# Patient Record
Sex: FEMALE | Race: WHITE | NOT HISPANIC OR LATINO | Employment: FULL TIME | URBAN - METROPOLITAN AREA
[De-identification: names, ages, dates, MRNs, and addresses within clinical notes are randomized per-mention and may not be internally consistent; named-entity substitution may affect disease eponyms.]

---

## 2017-01-04 ENCOUNTER — RESEARCH ENCOUNTER (OUTPATIENT)
Dept: RESEARCH | Facility: HOSPITAL | Age: 42
End: 2017-01-04

## 2017-01-04 ENCOUNTER — INITIAL CONSULT (OUTPATIENT)
Dept: GYNECOLOGIC ONCOLOGY | Facility: CLINIC | Age: 42
End: 2017-01-04
Payer: COMMERCIAL

## 2017-01-04 ENCOUNTER — TELEPHONE (OUTPATIENT)
Dept: GYNECOLOGIC ONCOLOGY | Facility: CLINIC | Age: 42
End: 2017-01-04

## 2017-01-04 VITALS
HEART RATE: 75 BPM | SYSTOLIC BLOOD PRESSURE: 132 MMHG | HEIGHT: 62 IN | WEIGHT: 172.19 LBS | DIASTOLIC BLOOD PRESSURE: 79 MMHG | BODY MASS INDEX: 31.68 KG/M2

## 2017-01-04 DIAGNOSIS — E25.9 VIRILIZATION: Primary | ICD-10-CM

## 2017-01-04 DIAGNOSIS — N83.8 OVARIAN MASS, LEFT: ICD-10-CM

## 2017-01-04 PROCEDURE — 99999 PR PBB SHADOW E&M-EST. PATIENT-LVL III: CPT | Mod: PBBFAC,,, | Performed by: OBSTETRICS & GYNECOLOGY

## 2017-01-04 PROCEDURE — 1159F MED LIST DOCD IN RCRD: CPT | Mod: S$GLB,,, | Performed by: OBSTETRICS & GYNECOLOGY

## 2017-01-04 PROCEDURE — 99205 OFFICE O/P NEW HI 60 MIN: CPT | Mod: S$GLB,,, | Performed by: OBSTETRICS & GYNECOLOGY

## 2017-01-04 NOTE — PROGRESS NOTES
"Subjective:       Patient ID: Josseline Harper is a 41 y.o. female.    Chief Complaint: Virilising Lt Ovarian Tumor (Consult)    HPI     Saw Dr. Berny Campbell, Endocrinologist,  because of increasing facial hair. Thought to be due initially to PCO but testosterone levels did not suppress with Prempro or dexamethasone. Work up to date has included a CT scan that showed normal ovaries. Octreotide study was negative.  Pelvic US showed bilateral ovarian cysts consistent with PCO.    Then had PET scan done at [facility] in Sept 2016 that shows a hypermetabolic area in the left ovary with SUV of 5.1.     Referred for further management.   Patient wants to preserve fertility.     Pap: 2015: normal. 1 prior abnormal with follow up pap being negative.   Mammogram: Oct 2016: normal.     Review of Systems   Constitutional: Negative for chills, fatigue and fever.   Respiratory: Negative for cough, shortness of breath and wheezing.    Cardiovascular: Negative for chest pain, palpitations and leg swelling.   Gastrointestinal: Negative for abdominal pain, constipation, diarrhea, nausea and vomiting.   Genitourinary: Negative for difficulty urinating, dysuria, frequency, genital sores, hematuria, urgency, vaginal bleeding, vaginal discharge and vaginal pain.   Neurological: Negative for weakness.   Hematological: Negative for adenopathy. Does not bruise/bleed easily.   Psychiatric/Behavioral: The patient is not nervous/anxious.        Objective:       Visit Vitals    /79    Pulse 75    Ht 5' 2" (1.575 m)    Wt 78.1 kg (172 lb 2.9 oz)    BMI 31.49 kg/m2       Physical Exam   Constitutional: She is oriented to person, place, and time. She appears well-developed and well-nourished.   HENT:   Head: Normocephalic and atraumatic.   Eyes: No scleral icterus.   Neck: Neck supple. No tracheal deviation present. No thyroid mass and no thyromegaly present.   Cardiovascular: Normal rate and regular rhythm.  "   Pulmonary/Chest: Effort normal and breath sounds normal. She has no wheezes.   Abdominal: Soft. She exhibits no distension and no mass. There is no hepatosplenomegaly. There is no tenderness. There is no rebound and no guarding.   Genitourinary:   Genitourinary Comments: Bimanual exam:  Vulva: no lesions. Normal appearance  Urethra: Normal size and location. No lesions  Bladder: No masses or tenderness.  Vagina: normal mucosa. No lesion  Cervix: normal    Uterus: normal  Adnexa: no masses.  Rectovaginal: No posterior cul de sac thickening or nodularity.  Rectal: no masses. Nontender. Normal tone.      Musculoskeletal: She exhibits no edema or tenderness.   Lymphadenopathy:     She has no cervical adenopathy.     She has no axillary adenopathy.        Right: No inguinal and no supraclavicular adenopathy present.        Left: No inguinal and no supraclavicular adenopathy present.   Neurological: She is alert and oriented to person, place, and time.   Skin: Skin is warm and dry.   Psychiatric: She has a normal mood and affect. Her behavior is normal. Judgment and thought content normal.       Assessment:       1. Virilization    2. Ovarian mass, left        Plan:   Virilization  -     US Pelvis Comp with Transvag NON-OB (xpd; Future; Expected date: 1/4/17    Ovarian mass, left  Will plan to proceed with RALO. Will attempt to preserve left fallopian tube given her desire to maintain fertility.   She want to wait until March 2017 due to work.   Consent forms were reviewed with patient. Questions were answered. Patient voiced understanding. Consents were signed.  Preop orders placed.     -     US Pelvis Comp with Transvag NON-OB (xpd; Future; Expected date: 1/4/17  -     Basic metabolic panel; Future; Expected date: 1/4/17  -     CBC auto differential; Future; Expected date: 1/4/17  -     EKG 12-lead; Future        Distress Screening Results: Psychosocial Distress screening score of Distress Score: 3 noted and reviewed.  No intervention indicated.

## 2017-01-04 NOTE — PROGRESS NOTES
Pt and one family member were approached by Aga Stuart in clinic regarding participation in [employer] program (IRB#[id number]). Pt was agreeable.   The ICF was reviewed with pt. The discussion included:   - participation is voluntary;   - pt can change her mind about participating at any time;   - if she changes her mind about participation, she can call us at contact info in ICF and we will discard samples remaining;   - samples that have been used prior to her notification will still be included in research;   - specimens collected will include blood, urine and tissue;   - blood and urine will be collected pre-operatively if possible;   - tissue will be collected from Pathology after routine tests have been conducted;   - Dr. Pérez will perform procedure per his usual protocol - participation in [employer] program will not change amount of tissue removed;   - specimens will be stored with unique code that can only be linked to pt by [employer] staff;   - all medical information released to researchers will be stripped of identifiers;   - samples will not be released to outside researchers unless approved by internal committee;   - there is a small risk of loss of confidentiality, but we make every effort to ensure privacy;   - no other physical risks outside of those involved in standard of care procedure.     Pt did not have any questions. Pt willingly and independently signed ICF for [employer]. A copy of signed ICF will be mailed to the pt with instructions to call with any questions that may arise or if she should change her mind regarding participation in [employer] program.

## 2017-01-04 NOTE — Clinical Note
Thank you for the referral. She wants to wait until March for left oophorectomy. Surgery has been scheduled.

## 2017-01-18 ENCOUNTER — HOSPITAL ENCOUNTER (OUTPATIENT)
Dept: RADIOLOGY | Facility: HOSPITAL | Age: 42
Discharge: HOME | End: 2017-01-18
Attending: OBSTETRICS & GYNECOLOGY
Payer: COMMERCIAL

## 2017-01-18 DIAGNOSIS — E25.9 VIRILIZATION: ICD-10-CM

## 2017-01-18 DIAGNOSIS — N83.8 OVARIAN MASS, LEFT: ICD-10-CM

## 2017-01-18 PROCEDURE — 76830 TRANSVAGINAL US NON-OB: CPT | Mod: 26,,, | Performed by: RADIOLOGY

## 2017-01-18 PROCEDURE — 76856 US EXAM PELVIC COMPLETE: CPT | Mod: 26,,, | Performed by: RADIOLOGY

## 2017-01-18 PROCEDURE — 76856 US EXAM PELVIC COMPLETE: CPT | Mod: TC

## 2017-01-22 ENCOUNTER — PATIENT MESSAGE (OUTPATIENT)
Dept: GYNECOLOGIC ONCOLOGY | Facility: CLINIC | Age: 42
End: 2017-01-22

## 2017-02-15 ENCOUNTER — HOSPITAL ENCOUNTER (OUTPATIENT)
Dept: CARDIOLOGY | Facility: CLINIC | Age: 42
Discharge: HOME | End: 2017-02-15
Payer: COMMERCIAL

## 2017-02-15 DIAGNOSIS — N83.8 OVARIAN MASS, LEFT: ICD-10-CM

## 2017-02-15 PROCEDURE — 93000 ELECTROCARDIOGRAM COMPLETE: CPT | Mod: S$GLB,,, | Performed by: INTERNAL MEDICINE

## 2017-03-10 ENCOUNTER — TELEPHONE (OUTPATIENT)
Dept: GYNECOLOGIC ONCOLOGY | Facility: CLINIC | Age: 42
End: 2017-03-10

## 2017-03-10 ENCOUNTER — ANESTHESIA EVENT (OUTPATIENT)
Dept: SURGERY | Facility: HOSPITAL | Age: 42
End: 2017-03-10
Payer: COMMERCIAL

## 2017-03-10 NOTE — ANESTHESIA PREPROCEDURE EVALUATION
03/10/2017    Pre-operative evaluation for Procedure(s) (LRB):  XI ROBOT ASSISTED LAPAROSCOPIC OOPHERECTOMY (Left)    Josseline Harper is a 41 y.o. female with no significant PMH who is being evaluated for the above procedure secondary to left ovarian mass.     LDA: left wrist 18G     Prev airway:  None on file     Drips:  None       Patient Active Problem List   Diagnosis    Bilateral lower extremity edema    History of polycythemia    Neuropathy of right lower extremity    Vitamin D deficiency    Female hypertestosteronemia    Prehypertension    Hirsutism    Adrenal virilism syndrome    Virilization    Ovarian tumor    Ovarian mass, left       Review of patient's allergies indicates:  No Known Allergies     No current facility-administered medications on file prior to encounter.      Current Outpatient Prescriptions on File Prior to Encounter   Medication Sig Dispense Refill    artificial tears ointment (REFRESH P.M.) Oint nightly as needed.       ASCORBATE CALCIUM (VITAMIN C ORAL) Take by mouth.      DOCOSAHEXANOIC ACID/EPA (FISH OIL ORAL) Take by mouth.      ERGOCALCIFEROL, VITAMIN D2, (VITAMIN D ORAL) Take by mouth.      estrogen, conjugated,-medroxyprogesterone (PREMPRO) 0.625-5 mg per tablet Take 1 tablet by mouth once daily. 10 tablet 0    PV W-O BRANDAN/FERROUS FUMARATE/FA (M-VIT ORAL) Take by mouth.         Past Surgical History:   Procedure Laterality Date    BELPHAROPTOSIS REPAIR         Social History     Social History    Marital status: Significant Other     Spouse name: N/A    Number of children: N/A    Years of education: N/A     Occupational History    manager in [employer]      Social History Main Topics    Smoking status: Never Smoker    Smokeless tobacco: Not on file    Alcohol use 0.6 oz/week     1 Glasses of wine per week      Comment: one per week    Drug  use: No    Sexual activity: Yes     Partners: Male     Birth control/ protection: Injection      Comment: inj     Other Topics Concern    Not on file     Social History Narrative         Vital Signs Range (Last 24H):         CBC: No results for input(s): WBC, RBC, HGB, HCT, PLT, MCV, MCH, MCHC in the last 72 hours.    CMP: No results for input(s): NA, K, CL, CO2, BUN, CREATININE, GLU, MG, PHOS, CALCIUM, ALBUMIN, PROT, ALKPHOS, ALT, AST, BILITOT in the last 72 hours.    INR  No results for input(s): INR, PROTIME, APTT in the last 72 hours.    Invalid input(s): PT        Diagnostic Studies:      EKG:Vent. Rate : 053 BPM     Atrial Rate : 053 BPM     P-R Int : 152 ms          QRS Dur : 092 ms      QT Int : 412 ms       P-R-T Axes : 046 061 050 degrees     QTc Int : 386 ms    Sinus bradycardia  Otherwise normal ECG  No previous ECGs available  Confirmed by Almas MALONE, Cathy ([id number]) on 2/15/2017 3:21:57 PM        2D Echo: none on file         [facility] Anesthesia Evaluation    I have reviewed the Patient Summary Reports.     I have reviewed the Medications.     Review of Systems  Anesthesia Hx:  No problems with previous Anesthesia  History of prior surgery of interest to airway management or planning: Previous anesthesia: General Denies Family Hx of Anesthesia complications.   Denies Personal Hx of Anesthesia complications.   Social:  Non-Smoker    Cardiovascular:  Cardiovascular Normal     Pulmonary:  Pulmonary Normal    Hepatic/GI:  Hepatic/GI Normal        Physical Exam  General:  Obesity    Airway/Jaw/Neck:  Airway Findings: Mouth Opening: Small, but > 3cm Tongue: Normal  General Airway Assessment: Adult  Mallampati: IV  Improves to III with phonation.  TM Distance: Normal, at least 6 cm  Jaw/Neck Findings:  Neck ROM: Normal ROM  Neck Findings:  Girth Increased      Dental:  Dental Findings: In tact   Chest/Lungs:  Chest/Lungs Findings: Clear to auscultation, Normal Respiratory Rate     Heart/Vascular:  Heart Findings:  Rate: Normal  Rhythm: Regular Rhythm  Sounds: Normal             Anesthesia Plan  Type of Anesthesia, risks & benefits discussed:  Anesthesia Type:  general  Patient's Preference:   Intra-op Monitoring Plan: standard ASA monitors  Intra-op Monitoring Plan Comments:   Post Op Pain Control Plan:   Post Op Pain Control Plan Comments:   Induction:   IV  Beta Blocker:  Patient is not currently on a Beta-Blocker (No further documentation required).       Informed Consent: Patient understands risks and agrees with Anesthesia plan.  Questions answered. Anesthesia consent signed with patient.  ASA Score: 2     Day of Surgery Review of History & Physical:    H&P update referred to the surgeon.         Ready For Surgery From Anesthesia Perspective.

## 2017-03-10 NOTE — TELEPHONE ENCOUNTER
Spoke with pt. Pt confirmed surgery time and location. she is aware to go to second floor surgery center for 5:30

## 2017-03-12 DIAGNOSIS — R19.00 PELVIC MASS IN FEMALE: Primary | ICD-10-CM

## 2017-03-13 ENCOUNTER — HOSPITAL ENCOUNTER (OUTPATIENT)
Facility: HOSPITAL | Age: 42
Discharge: HOME | End: 2017-03-14
Attending: OBSTETRICS & GYNECOLOGY | Admitting: OBSTETRICS & GYNECOLOGY
Payer: COMMERCIAL

## 2017-03-13 ENCOUNTER — ANESTHESIA (OUTPATIENT)
Dept: SURGERY | Facility: HOSPITAL | Age: 42
End: 2017-03-13
Payer: COMMERCIAL

## 2017-03-13 DIAGNOSIS — Z98.890 S/P ROBOT-ASSISTED SURGICAL PROCEDURE: Primary | ICD-10-CM

## 2017-03-13 DIAGNOSIS — E25.9 VIRILIZATION: ICD-10-CM

## 2017-03-13 DIAGNOSIS — R19.00 PELVIC MASS IN FEMALE: ICD-10-CM

## 2017-03-13 PROBLEM — Z90.721 S/P LEFT OOPHORECTOMY: Status: ACTIVE | Noted: 2017-03-13

## 2017-03-13 LAB
ABO + RH BLD: NORMAL
B-HCG UR QL: NEGATIVE
BLD GP AB SCN CELLS X3 SERPL QL: NORMAL
CTP QC/QA: YES

## 2017-03-13 PROCEDURE — 63600175 PHARM REV CODE 636 W HCPCS: Performed by: STUDENT IN AN ORGANIZED HEALTH CARE EDUCATION/TRAINING PROGRAM

## 2017-03-13 PROCEDURE — 88112 CYTOPATH CELL ENHANCE TECH: CPT | Mod: 26,,, | Performed by: PATHOLOGY

## 2017-03-13 PROCEDURE — 71000039 HC RECOVERY, EACH ADD'L HOUR: Performed by: OBSTETRICS & GYNECOLOGY

## 2017-03-13 PROCEDURE — 36000711: Performed by: OBSTETRICS & GYNECOLOGY

## 2017-03-13 PROCEDURE — 88305 TISSUE EXAM BY PATHOLOGIST: CPT | Performed by: PATHOLOGY

## 2017-03-13 PROCEDURE — 58661 LAPAROSCOPY REMOVE ADNEXA: CPT | Mod: ,,, | Performed by: OBSTETRICS & GYNECOLOGY

## 2017-03-13 PROCEDURE — 71000033 HC RECOVERY, INTIAL HOUR: Performed by: OBSTETRICS & GYNECOLOGY

## 2017-03-13 PROCEDURE — 37000008 HC ANESTHESIA 1ST 15 MINUTES: Performed by: OBSTETRICS & GYNECOLOGY

## 2017-03-13 PROCEDURE — 25000003 PHARM REV CODE 250: Performed by: OBSTETRICS & GYNECOLOGY

## 2017-03-13 PROCEDURE — D9220A PRA ANESTHESIA: Mod: ,,, | Performed by: ANESTHESIOLOGY

## 2017-03-13 PROCEDURE — 36000710: Performed by: OBSTETRICS & GYNECOLOGY

## 2017-03-13 PROCEDURE — 25000003 PHARM REV CODE 250: Performed by: STUDENT IN AN ORGANIZED HEALTH CARE EDUCATION/TRAINING PROGRAM

## 2017-03-13 PROCEDURE — 63600175 PHARM REV CODE 636 W HCPCS: Performed by: OBSTETRICS & GYNECOLOGY

## 2017-03-13 PROCEDURE — 58661 LAPAROSCOPY REMOVE ADNEXA: CPT | Mod: AS,LT,, | Performed by: PHYSICIAN ASSISTANT

## 2017-03-13 PROCEDURE — 37000009 HC ANESTHESIA EA ADD 15 MINS: Performed by: OBSTETRICS & GYNECOLOGY

## 2017-03-13 PROCEDURE — 88307 TISSUE EXAM BY PATHOLOGIST: CPT | Performed by: PATHOLOGY

## 2017-03-13 PROCEDURE — 25000003 PHARM REV CODE 250

## 2017-03-13 PROCEDURE — 81025 URINE PREGNANCY TEST: CPT | Performed by: OBSTETRICS & GYNECOLOGY

## 2017-03-13 PROCEDURE — 86850 RBC ANTIBODY SCREEN: CPT

## 2017-03-13 PROCEDURE — 86900 BLOOD TYPING SEROLOGIC ABO: CPT

## 2017-03-13 PROCEDURE — 27201423 OPTIME MED/SURG SUP & DEVICES STERILE SUPPLY: Performed by: OBSTETRICS & GYNECOLOGY

## 2017-03-13 PROCEDURE — 88307 TISSUE EXAM BY PATHOLOGIST: CPT | Mod: 26,,, | Performed by: PATHOLOGY

## 2017-03-13 PROCEDURE — 94799 UNLISTED PULMONARY SVC/PX: CPT

## 2017-03-13 RX ORDER — HYDROCODONE BITARTRATE AND ACETAMINOPHEN 5; 325 MG/1; MG/1
1 TABLET ORAL EVERY 4 HOURS PRN
Status: DISCONTINUED | OUTPATIENT
Start: 2017-03-13 | End: 2017-03-14 | Stop reason: HOSPADM

## 2017-03-13 RX ORDER — SUCCINYLCHOLINE CHLORIDE 20 MG/ML
INJECTION INTRAMUSCULAR; INTRAVENOUS
Status: DISCONTINUED | OUTPATIENT
Start: 2017-03-13 | End: 2017-03-13

## 2017-03-13 RX ORDER — DEXAMETHASONE SODIUM PHOSPHATE 4 MG/ML
INJECTION, SOLUTION INTRA-ARTICULAR; INTRALESIONAL; INTRAMUSCULAR; INTRAVENOUS; SOFT TISSUE
Status: DISCONTINUED | OUTPATIENT
Start: 2017-03-13 | End: 2017-03-13

## 2017-03-13 RX ORDER — HEPARIN SODIUM 1000 [USP'U]/ML
INJECTION, SOLUTION INTRAVENOUS; SUBCUTANEOUS
Status: DISCONTINUED | OUTPATIENT
Start: 2017-03-13 | End: 2017-03-13

## 2017-03-13 RX ORDER — DOCUSATE SODIUM 100 MG/1
100 CAPSULE, LIQUID FILLED ORAL DAILY
Status: DISCONTINUED | OUTPATIENT
Start: 2017-03-13 | End: 2017-03-14 | Stop reason: HOSPADM

## 2017-03-13 RX ORDER — ONDANSETRON 8 MG/1
8 TABLET, ORALLY DISINTEGRATING ORAL EVERY 8 HOURS PRN
Status: DISCONTINUED | OUTPATIENT
Start: 2017-03-13 | End: 2017-03-14 | Stop reason: HOSPADM

## 2017-03-13 RX ORDER — ACETAMINOPHEN 10 MG/ML
1000 INJECTION, SOLUTION INTRAVENOUS ONCE
Status: DISCONTINUED | OUTPATIENT
Start: 2017-03-13 | End: 2017-03-13

## 2017-03-13 RX ORDER — DIPHENHYDRAMINE HCL 25 MG
25 CAPSULE ORAL EVERY 6 HOURS PRN
Status: DISCONTINUED | OUTPATIENT
Start: 2017-03-13 | End: 2017-03-14 | Stop reason: HOSPADM

## 2017-03-13 RX ORDER — KETAMINE HCL IN 0.9 % NACL 50 MG/5 ML
SYRINGE (ML) INTRAVENOUS
Status: DISCONTINUED | OUTPATIENT
Start: 2017-03-13 | End: 2017-03-13

## 2017-03-13 RX ORDER — DEXTROSE MONOHYDRATE, SODIUM CHLORIDE, AND POTASSIUM CHLORIDE 50; 1.49; 4.5 G/1000ML; G/1000ML; G/1000ML
INJECTION, SOLUTION INTRAVENOUS CONTINUOUS
Status: DISCONTINUED | OUTPATIENT
Start: 2017-03-13 | End: 2017-03-14 | Stop reason: HOSPADM

## 2017-03-13 RX ORDER — ROCURONIUM BROMIDE 10 MG/ML
INJECTION, SOLUTION INTRAVENOUS
Status: DISCONTINUED | OUTPATIENT
Start: 2017-03-13 | End: 2017-03-13

## 2017-03-13 RX ORDER — MIDAZOLAM HYDROCHLORIDE 1 MG/ML
INJECTION, SOLUTION INTRAMUSCULAR; INTRAVENOUS
Status: DISCONTINUED | OUTPATIENT
Start: 2017-03-13 | End: 2017-03-13

## 2017-03-13 RX ORDER — LIDOCAINE HCL/PF 100 MG/5ML
SYRINGE (ML) INTRAVENOUS
Status: DISCONTINUED | OUTPATIENT
Start: 2017-03-13 | End: 2017-03-13

## 2017-03-13 RX ORDER — GLYCOPYRROLATE 0.2 MG/ML
INJECTION INTRAMUSCULAR; INTRAVENOUS
Status: DISCONTINUED | OUTPATIENT
Start: 2017-03-13 | End: 2017-03-13

## 2017-03-13 RX ORDER — DEXTROSE MONOHYDRATE, SODIUM CHLORIDE, AND POTASSIUM CHLORIDE 50; 1.49; 4.5 G/1000ML; G/1000ML; G/1000ML
INJECTION, SOLUTION INTRAVENOUS
Status: COMPLETED
Start: 2017-03-13 | End: 2017-03-13

## 2017-03-13 RX ORDER — HYDROMORPHONE HYDROCHLORIDE 1 MG/ML
0.5 INJECTION, SOLUTION INTRAMUSCULAR; INTRAVENOUS; SUBCUTANEOUS EVERY 4 HOURS PRN
Status: DISCONTINUED | OUTPATIENT
Start: 2017-03-13 | End: 2017-03-14 | Stop reason: HOSPADM

## 2017-03-13 RX ORDER — SODIUM CHLORIDE 9 MG/ML
INJECTION, SOLUTION INTRAVENOUS CONTINUOUS
Status: DISCONTINUED | OUTPATIENT
Start: 2017-03-13 | End: 2017-03-13

## 2017-03-13 RX ORDER — ONDANSETRON 2 MG/ML
INJECTION INTRAMUSCULAR; INTRAVENOUS
Status: DISCONTINUED | OUTPATIENT
Start: 2017-03-13 | End: 2017-03-13

## 2017-03-13 RX ORDER — NEOSTIGMINE METHYLSULFATE 1 MG/ML
INJECTION, SOLUTION INTRAVENOUS
Status: DISCONTINUED | OUTPATIENT
Start: 2017-03-13 | End: 2017-03-13

## 2017-03-13 RX ORDER — HYDROMORPHONE HYDROCHLORIDE 1 MG/ML
0.2 INJECTION, SOLUTION INTRAMUSCULAR; INTRAVENOUS; SUBCUTANEOUS EVERY 5 MIN PRN
Status: DISCONTINUED | OUTPATIENT
Start: 2017-03-13 | End: 2017-03-13 | Stop reason: HOSPADM

## 2017-03-13 RX ORDER — IBUPROFEN 600 MG/1
600 TABLET ORAL EVERY 6 HOURS PRN
Status: DISCONTINUED | OUTPATIENT
Start: 2017-03-13 | End: 2017-03-14 | Stop reason: HOSPADM

## 2017-03-13 RX ORDER — SODIUM CHLORIDE 0.9 % (FLUSH) 0.9 %
3 SYRINGE (ML) INJECTION
Status: DISCONTINUED | OUTPATIENT
Start: 2017-03-13 | End: 2017-03-13

## 2017-03-13 RX ORDER — SIMETHICONE 80 MG
1 TABLET,CHEWABLE ORAL 3 TIMES DAILY PRN
Status: DISCONTINUED | OUTPATIENT
Start: 2017-03-13 | End: 2017-03-14 | Stop reason: HOSPADM

## 2017-03-13 RX ORDER — PROPOFOL 10 MG/ML
VIAL (ML) INTRAVENOUS
Status: DISCONTINUED | OUTPATIENT
Start: 2017-03-13 | End: 2017-03-13

## 2017-03-13 RX ORDER — FENTANYL CITRATE 50 UG/ML
INJECTION, SOLUTION INTRAMUSCULAR; INTRAVENOUS
Status: DISCONTINUED | OUTPATIENT
Start: 2017-03-13 | End: 2017-03-13

## 2017-03-13 RX ADMIN — HYDROMORPHONE HYDROCHLORIDE 0.5 MG: 1 INJECTION, SOLUTION INTRAMUSCULAR; INTRAVENOUS; SUBCUTANEOUS at 11:03

## 2017-03-13 RX ADMIN — HYDROCODONE BITARTRATE AND ACETAMINOPHEN 1 TABLET: 5; 325 TABLET ORAL at 02:03

## 2017-03-13 RX ADMIN — GLYCOPYRROLATE 0.6 MG: 0.2 INJECTION, SOLUTION INTRAMUSCULAR; INTRAVENOUS at 08:03

## 2017-03-13 RX ADMIN — FENTANYL CITRATE 50 MCG: 50 INJECTION, SOLUTION INTRAMUSCULAR; INTRAVENOUS at 07:03

## 2017-03-13 RX ADMIN — FENTANYL CITRATE 100 MCG: 50 INJECTION, SOLUTION INTRAMUSCULAR; INTRAVENOUS at 07:03

## 2017-03-13 RX ADMIN — LIDOCAINE HYDROCHLORIDE 100 MG: 20 INJECTION, SOLUTION INTRAVENOUS at 07:03

## 2017-03-13 RX ADMIN — Medication 2 G: at 07:03

## 2017-03-13 RX ADMIN — SODIUM CHLORIDE: 0.9 INJECTION, SOLUTION INTRAVENOUS at 06:03

## 2017-03-13 RX ADMIN — DEXTROSE MONOHYDRATE, SODIUM CHLORIDE, AND POTASSIUM CHLORIDE: 50; 4.5; 1.49 INJECTION, SOLUTION INTRAVENOUS at 09:03

## 2017-03-13 RX ADMIN — DEXTROSE MONOHYDRATE, SODIUM CHLORIDE, AND POTASSIUM CHLORIDE: 50; 4.5; 1.49 INJECTION, SOLUTION INTRAVENOUS at 08:03

## 2017-03-13 RX ADMIN — ONDANSETRON 4 MG: 2 INJECTION INTRAMUSCULAR; INTRAVENOUS at 08:03

## 2017-03-13 RX ADMIN — NEOSTIGMINE METHYLSULFATE 5 MG: 1 INJECTION INTRAVENOUS at 08:03

## 2017-03-13 RX ADMIN — SUCCINYLCHOLINE CHLORIDE 100 MG: 20 INJECTION, SOLUTION INTRAMUSCULAR; INTRAVENOUS at 07:03

## 2017-03-13 RX ADMIN — DEXAMETHASONE SODIUM PHOSPHATE 8 MG: 4 INJECTION, SOLUTION INTRAMUSCULAR; INTRAVENOUS at 07:03

## 2017-03-13 RX ADMIN — DOCUSATE SODIUM 100 MG: 100 CAPSULE, LIQUID FILLED ORAL at 10:03

## 2017-03-13 RX ADMIN — HYDROCODONE BITARTRATE AND ACETAMINOPHEN 1 TABLET: 5; 325 TABLET ORAL at 10:03

## 2017-03-13 RX ADMIN — Medication 20 MG: at 07:03

## 2017-03-13 RX ADMIN — SODIUM CHLORIDE, SODIUM GLUCONATE, SODIUM ACETATE, POTASSIUM CHLORIDE, MAGNESIUM CHLORIDE, SODIUM PHOSPHATE, DIBASIC, AND POTASSIUM PHOSPHATE: .53; .5; .37; .037; .03; .012; .00082 INJECTION, SOLUTION INTRAVENOUS at 07:03

## 2017-03-13 RX ADMIN — ROCURONIUM BROMIDE 5 MG: 10 INJECTION, SOLUTION INTRAVENOUS at 07:03

## 2017-03-13 RX ADMIN — MIDAZOLAM HYDROCHLORIDE 2 MG: 1 INJECTION, SOLUTION INTRAMUSCULAR; INTRAVENOUS at 06:03

## 2017-03-13 RX ADMIN — PROPOFOL 170 MG: 10 INJECTION, EMULSION INTRAVENOUS at 07:03

## 2017-03-13 RX ADMIN — ROCURONIUM BROMIDE 20 MG: 10 INJECTION, SOLUTION INTRAVENOUS at 07:03

## 2017-03-13 NOTE — TRANSFER OF CARE
"Anesthesia Transfer of Care Note    Patient: Josseline Harper    Procedure(s) Performed: Procedure(s) (LRB):  XI ROBOT ASSISTED LAPAROSCOPIC OOPHERECTOMY (Left)    Patient location: PACU    Anesthesia Type: general    Transport from OR: Transported from OR on 6-10 L/min O2 by face mask with adequate spontaneous ventilation    Post pain: adequate analgesia    Post assessment: no apparent anesthetic complications    Post vital signs: stable    Level of consciousness: awake    Nausea/Vomiting: no nausea/vomiting    Comments: Bronchospasm on emergence       Last vitals:   Visit Vitals    BP (!) 152/70 (BP Location: Left arm, Patient Position: Lying, BP Method: Automatic)    Pulse 75    Temp 36.1 °C (97 °F) (Axillary)    Resp 18    Ht 5' 2" (1.575 m)    Wt 81.6 kg (180 lb)    LMP     SpO2 100%    Breastfeeding No    BMI 32.92 kg/m2     "

## 2017-03-13 NOTE — PROGRESS NOTES
Progress Note  Gynecological Oncology      Admit Date: 3/13/2017  Post-operative Day: Day of Surgery  Hospital Day: 1    SUBJECTIVE:     Josseline Harper is a 41 y.o. G0 who is POD#0 s/p Robotic Assisted Left Oophorectomy 2/2 Virilizing Left Ovarian Tumor.     Ms. Harper is doing well this afternoon. She reports that she has tolerated soup with no nausea or vomiting. She reports that her pain is well controlled with PO pain medication. She is ambulating and voiding with no nausea or vomiting. She has not passed flatus nor has she had a bowel movement since surgery.     Scheduled Meds:   acetaminophen  1,000 mg Intravenous Once    docusate sodium  100 mg Oral Daily     Continuous Infusions:   dextrose 5 % and 0.45 % NaCl with KCl 20 mEq 100 mL/hr at 03/13/17 0924     PRN Meds:diphenhydrAMINE, hydrocodone-acetaminophen 5-325mg, HYDROmorphone, HYDROmorphone, ibuprofen, ondansetron, promethazine (PHENERGAN) IVPB, simethicone, sodium chloride 0.9%    Review of patient's allergies indicates:  No Known Allergies    OBJECTIVE:     Vital Signs (Most Recent)  Temp: 97 °F (36.1 °C) (03/13/17 0906)  Pulse: 71 (03/13/17 1015)  Resp: (!) 21 (03/13/17 1015)  BP: 124/61 (03/13/17 1015)  SpO2: (!) 94 % (03/13/17 1015)    Temperature Range Min/Max (Last 24H):  Temp:  [97 °F (36.1 °C)-97.8 °F (36.6 °C)]     Vital Signs Range (Last 24H):  Temp:  [97 °F (36.1 °C)-97.8 °F (36.6 °C)]   Pulse:  [68-82]   Resp:  [18-23]   BP: (124-152)/(61-88)   SpO2:  [94 %-100 %]     I & O (Last 24H):  Intake/Output Summary (Last 24 hours) at 03/13/17 1025  Last data filed at 03/13/17 0831   Gross per 24 hour   Intake             2700 ml   Output              250 ml   Net             2450 ml     Physical Exam:  General: well developed, well nourished, no distress  Lungs:  clear to auscultation bilaterally and normal respiratory effort  Heart: regular rate and rhythm, S1, S2 normal, no murmur, click, rub or gallop  Abdomen: soft, non-tender  non-distented; bowel sounds normal; no masses,  no organomegaly  Wound/Incision: clean, dry, intact  Extremities: no cyanosis or edema, or clubbing    Lines/Drains:       Peripheral IV - Single Lumen 03/13/17 0616 Left Forearm (Active)   Site Assessment Dry;Clean;Intact;No redness;No swelling;No drainage;No warmth 3/13/2017  9:09 AM   Line Status Saline locked 3/13/2017  9:09 AM   Dressing Status Clean;Dry;Intact 3/13/2017  9:09 AM   Dressing Intervention New dressing 3/13/2017  6:16 AM   Number of days:0            Peripheral IV - Single Lumen 03/13/17 0719 Left Wrist (Active)   Site Assessment Clean;Dry;Intact;No redness;No swelling;No warmth;No drainage 3/13/2017  9:09 AM   Line Status Infusing 3/13/2017  9:09 AM   Dressing Status Clean;Intact;Dry 3/13/2017  9:09 AM   Number of days:0            Urethral Catheter 03/13/17 0720 Non-latex 16 Fr. (Active)   Site Assessment Clean;Intact 3/13/2017  9:09 AM   Collection Container Urimeter 3/13/2017  9:09 AM   Securement Method secured to top of thigh w/ adhesive device 3/13/2017  9:09 AM   Number of days:0       ASSESSMENT/PLAN:     Active Hospital Problems    Diagnosis  POA    *S/P robot-assisted Left Oophorectomy [Z98.890]  Not Applicable    Pelvic mass in female [R19.00]  Yes    S/P left oophorectomy [Z90.721]  Not Applicable    Virilization [E25.9]  Yes      Resolved Hospital Problems    Diagnosis Date Resolved POA   No resolved problems to display.     Assessment: Josselien Harper is a 41 y.o. G0 who is POD#0 s/p Robotic Assisted Left Oophorectomy 2/2 Virilizing Left Ovarian Tumor.     Plan:   1. s/p Robotic Assisted Left Oophorectomy  - Ibuprofen/Norco PRN pain  - Zofran/Phenergan PRN nausea or vomiting  - Regular Diet  - Encourage ambulation  - IS at bedside  - No labs indicated at this time  - No ansari in place  - Simethicone PRN   - Benadryl PRN itching  - JORGE hoses and SCDs in place      2. Bilateral Lower extremity Edema  - no issues    3. Ptosis of  Bilateral Eye Lids  - no issues at this time        Preventive Measures:  DVT prophylaxis    Tino Monroe MD  PGY 3 OB/GYN  [phone]

## 2017-03-13 NOTE — ANESTHESIA POSTPROCEDURE EVALUATION
"Anesthesia Post Evaluation    Patient: Josseline Harper    Procedure(s) Performed: Procedure(s) (LRB):  XI ROBOT ASSISTED LAPAROSCOPIC OOPHERECTOMY (Left)    Final Anesthesia Type: general  Patient location during evaluation: PACU  Patient participation: Yes- Able to Participate  Level of consciousness: awake and alert and oriented  Post-procedure vital signs: reviewed and stable  Pain management: adequate  Airway patency: patent  PONV status at discharge: No PONV  Anesthetic complications: no      Cardiovascular status: blood pressure returned to baseline  Respiratory status: unassisted and nasal cannula  Hydration status: euvolemic  Follow-up not needed.        Visit Vitals    /66 (BP Location: Left arm, Patient Position: Lying, BP Method: Automatic)    Pulse 71    Temp 36.3 °C (97.4 °F) (Axillary)    Resp 19    Ht 5' 2" (1.575 m)    Wt 81.6 kg (180 lb)    LMP     SpO2 96%    Breastfeeding No    BMI 32.92 kg/m2       Pain/Barrington Score: Pain Assessment Performed: Yes (3/13/2017 10:58 AM)  Presence of Pain: complains of pain/discomfort (3/13/2017 10:58 AM)  Pain Rating Prior to Med Admin: 3 (3/13/2017 10:37 AM)  Barrington Score: 8 (3/13/2017  9:09 AM)      "

## 2017-03-13 NOTE — PLAN OF CARE
Problem: Patient Care Overview  Goal: Individualization & Mutuality  Patient has 5 lap sites with steri strips.

## 2017-03-13 NOTE — NURSING TRANSFER
Nursing Transfer Note      3/13/2017     Transfer To: 860    Transfer via stretcher    Transfer with IV pump    Transported by pct    Medicines sent: yes    Chart send with patient: Yes    Notified: spouse

## 2017-03-13 NOTE — IP AVS SNAPSHOT
[facility]  [address]  [facility] [address]  Phone: [phone]           Patient Discharge Instructions     Our goal is to set you up for success. This packet includes information on your condition, medications, and your home care. It will help you to care for yourself so you don't get sicker and need to go back to the hospital.     Please ask your nurse if you have any questions.        There are many details to remember when preparing to leave the hospital. Here is what you will need to do:    1. Take your medicine. If you are prescribed medications, review your Medication List in the following pages. You may have new medications to  at the pharmacy and others that you'll need to stop taking. Review the instructions for how and when to take your medications. Talk with your doctor or nurses if you are unsure of what to do.     2. Go to your follow-up appointments. Specific follow-up information is listed in the following pages. Your may be contacted by a transition nurse or clinical provider about future appointments. Be sure we have all of the phone numbers to reach you, if needed. Please contact your provider's office if you are unable to make an appointment.     3. Watch for warning signs. Your doctor or nurse will give you detailed warning signs to watch for and when to call for assistance. These instructions may also include educational information about your condition. If you experience any of warning signs to your health, call your doctor.               [facility]  Unless otherwise directed by your provider, please contact [facility], our nurse care line that is available for 24/7 assistance.     [phone] (toll-free)    Registered nurses in the [facility] provide clinical advisement, health education, appointment booking, and other advisory services.                    ** Verify the list of medication(s) below is accurate and up  to date. Carry this with you in case of emergency. If your medications have changed, please notify your healthcare provider.             Medication List      START taking these medications        Additional Info    Begin Date AM Noon PM Bedtime    hydrocodone-acetaminophen 5-325mg 5-325 mg per tablet   Commonly known as:  NORCO   Quantity:  20 tablet   Refills:  0   Dose:  1 tablet    Last time this was given:  1 tablet on 3/14/2017  7:48 AM   Instructions:  Take 1 tablet by mouth every 4 to 6 hours as needed.                            ibuprofen 600 MG tablet   Commonly known as:  ADVIL,MOTRIN   Quantity:  30 tablet   Refills:  1   Dose:  600 mg    Instructions:  Take 1 tablet (600 mg total) by mouth every 6 (six) hours as needed.                              CONTINUE taking these medications        Additional Info    Begin Date AM Noon PM Bedtime    artificial tears ointment Oint   Commonly known as:  REFRESH P.M.   Refills:  0    Instructions:  nightly as needed.                            estrogen (conjugated)-medroxyprogesterone 0.625-5 mg per tablet   Commonly known as:  PREMPRO   Quantity:  10 tablet   Refills:  0   Dose:  1 tablet    Instructions:  Take 1 tablet by mouth once daily.                               FISH OIL ORAL   Refills:  0    Instructions:  Take by mouth.                            M-VIT ORAL   Refills:  0    Instructions:  Take by mouth.                            VITAMIN C ORAL   Refills:  0    Instructions:  Take by mouth.                            VITAMIN D ORAL   Refills:  0    Instructions:  Take by mouth.                                 Where to Get Your Medications      These medications were sent to [facility] [place], [place] [address] [address] [place] [address]     Phone:  [phone]     ibuprofen 600 MG tablet         You can get these medications from any pharmacy     Bring a paper prescription for each of these medications      hydrocodone-acetaminophen 5-325mg 5-325 mg per tablet                  Please bring to all follow up appointments:    1. A copy of your discharge instructions.  2. All medicines you are currently taking in their original bottles.  3. Identification and insurance card.    Please arrive 15 minutes ahead of scheduled appointment time.    Please call 24 hours in advance if you must reschedule your appointment and/or time.        Your Scheduled Appointments     Apr 25, 2017 11:00 AM CDT   Established Patient with MD Lynda Norwood - Endo/Diabetes ([place])    [address] [facility] [place] [address]   [phone]            Aug 02, 2017 10:00 AM CDT   Established Patient Follow Up- [facility] with Guerita Peter MD   [facility] ([facility])    [address] [place] [address] [phone]              Follow-up Information     Follow up with Jake Molina MD. Schedule an appointment as soon as possible for a visit in 4 weeks.    Specialty:  Gynecologic Oncology    Why:  Post op    Contact information:    [address]  [facility] [address]  [phone]          Discharge Instructions     Future Orders    Type And Screen Preop     Type And Screen Preop     Activity as tolerated     Call MD for:  difficulty breathing or increased cough     Call MD for:  increased confusion or weakness     Call MD for:  persistent nausea and vomiting or diarrhea     Call MD for:  redness, tenderness, or signs of infection (pain, swelling, redness, odor or green/yellow discharge around incision site)     Call MD for:  severe persistent headache     Call MD for:  severe uncontrolled pain     Call MD for:  temperature >100.4     Diet general     Questions:    Total calories:      Fat restriction, if any:      Protein restriction, if any:      Na restriction, if any:      Fluid restriction:      Additional restrictions:          Primary Diagnosis     Your primary diagnosis was:  S/P Robot-Assisted  "Surgical Procedure      Admission Information     Date & Time Provider Department CSN    3/13/2017  5:25 AM Jake Molina MD [facility] [id number]      Care Providers     Provider Role Specialty Primary office phone    Jake Molina MD Attending Provider Gynecologic Oncology [phone]    Jake Molina MD Surgeon  Gynecologic Oncology [phone]      Your Vitals Were     BP Pulse Temp Resp Height Weight    118/74 (BP Location: Left arm, BP Method: Automatic) 94 98.3 °F (36.8 °C) (Oral) 16 5' 2" (1.575 m) 81.6 kg (180 lb)    SpO2 BMI             95% 32.92 kg/m2         Recent Lab Values        4/22/2016                           2:27 PM           A1C 5.4                       Pending Labs     Order Current Status    Cytology Specimen-Medical Cytology (Fluid/Wash/Brush) In process    Specimen to Pathology - Surgery In process      Allergies as of 3/14/2017     No Known Allergies      Advance Directives     An advance directive is a document which, in the event you are no longer able to make decisions for yourself, tells your healthcare team what kind of treatment you do or do not want to receive, or who you would like to make those decisions for you.  If you do not currently have an advance directive, [facility] encourages you to create one.  For more information call:  [phone] ([phone]),  or log on to [email or web address].        Language Assistance Services     ATTENTION: Language assistance services are available, free of charge. Please call [phone].      ATENCIÓN: Si habla español, tiene a cha disposición servicios gratuitos de asistencia lingüística. Llame al [phone].     CHÚ Ý: N?u b?n nói Ti?ng Vi?t, có các d?ch v? h? tr? ngôn ng? mi?n phí dành cho b?n. G?i s? [phone].         [facility] complies with applicable Federal civil rights laws and does not discriminate on the basis of race, color, national " origin, age, disability, or sex.

## 2017-03-13 NOTE — OP NOTE
DATE OF PROCEDURE:  03/13/2017.    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    PROCEDURE PERFORMED:  Xi robotic-assisted left oophorectomy.    SURGEON:  Jake Molina M.D.    FIRST ASSISTANT:  Rukhsana Loco PA-C; Qualified resident not available.     SECOND ASSISTANT:  Tino Monroe M.D. (RES);     THRID ASSISTANT:  Alejo Howard M.D.    ANESTHESIA:  GETA.    OPERATIVE HISTORY:  This is a 41-year-old patient who was being evaluated by Dr. Berny Campbell, an endocrinologist, for increasing facial hair.  This was   initially thought to be due to polycystic ovary syndrome.  However, her   testosterone level did not suppress with Prempro or dexamethasone.  Workup   included a CT scan that showed normal ovaries.  Octreotide study was negative.    Pelvic ultrasound showed bilateral ovarian cysts consistent with PCO.  A PET   scan, however, was then done in September 2016 that showed a hypermetabolic area   in the left ovary with an SUV of 5.1.  The patient was referred for further   management.    OPERATIVE FINDINGS:  The uterus was normal.  The right tube and ovary were   normal.  The left fallopian tube was normal.  The right ovary was slightly   enlarged.  There was a more cystic area on the superior aspect of it.  The right   ovary did not have any evidence for polycystic ovary syndrome.    The visualized portions of the omentum and diaphragm appeared normal to   visualization.  Both diaphragms were inspected with the laparoscope and were   normal.  Stomach was normal as well.    OPERATIVE PROCEDURE IN DETAIL:  The patient was brought to the Operating Room   and, after induction of general anesthesia, was placed in [facility].    The vulva and vagina were then prepped with Betadine scrub and solution.  The   abdomen was prepped with ChloraPrep and the patient was sterilely  draped.    After a timeout, a Faith catheter was placed.  Following this, the cervix was   exposed with a weighted speculum and right angle retractor.  Cervix was grasped   with single toothed tenaculum.  The Hulka clamp was inserted into the   endometrial cavity.    We changed gloves.  Attention was now directed to the abdomen.    A Veress needle was passed through the umbilicus.  Initial water drop test was   positive.  The opening pressure was initially -1 mmHg and then jumped to 15 mmHg as we began to   insufflate.  The needle was removed.  The needle was passed a second time with   again a positive water droplet test and an opening pressure of 5 mmHg.  The   abdomen was then insufflated to 15 mmHg.    An incision was made just below the umbilicus.  A blunt Xi trocar was then   inserted.  This initially was felt to go in, but we did not have return of air   on removing the obturator.  The trocar was further inserted and again we did not   have any return of air.  At this point, the camera was inserted and it appeared   that there was omentum beneath the camera and we were in the abdominal cavity.    The abdomen was then insufflated to 20 mmHg as we placed the three remaining   trocars.  There was a right and left robotic arm trocar placed 15 degrees below   and 12 cm lateral to the camera port and a 12 mm Optiview trocar in the left   upper quadrant.  These were all placed under direct visualization. Pressure was then reduced to 15 mmHg after placement of trocars.      Prior to doing that, I inspected the areas directly beneath the camera trocar   and there appeared to be no evidence for any injury.    At this point, graspers were used and we lifted the omentum out of the pelvis.    The small bowel directly beneath the umbilicus was inspected and several other   loops were inspected as well and there was no evidence for a bowel injury.    Cytologic washing was then taken from the pelvis and the patient was then  placed   in steep Trendelenburg.    Attention was now directed to the left ovary.  The suspensory ligament of the   ovary was cauterized.  The mesosalpinx was then cauterized with the monopolar   scissors.  This brought us then to the superior pole of the ovary.  The   fallopian tube was dissected away from this being careful of the location of the   frimbriae.  The remaining blood vessels were then cauterized and the ovary was   .  It was placed into the posterior cul-de-sac.    The surgical bed was reinspected.  The surgical bed was further cauterized.    I then used the robot to further inspect the upper abdomen and additional loops   of the small bowel and there appeared to be no injuries.    EndoCatch bag was then inserted through the 12 mm AirSeal trocar.  The ovary was   removed and brought up to the anterior abdominal wall.    The robot was undocked.  The 12 mm AirSeal trocar was removed and the ovary was   pulled through the fascia without having to further extend it.    We then used the Ivan-Kurt and placed a 0-Vicryl suture in this area.    This was held in place, re-creating our pneumoperitoneum.  The patient was   leveled out and we then reinspected the bowel and there appeared to be no   injuries.  There also appeared to be no vascular injury.    At this point, the two robotic arm trocars were removed.  The camera robotic arm   trocar was then opened and the camera was removed and the abdomen was   desufflated.    The fascial stitch was tied.  The four skin incisions were then closed with a   running 4-0 Monocryl suture in a subcuticular closure.    At this point, the patient was then awakened and taken to the Recovery Room in   stable condition.    ESTIMATED BLOOD LOSS:  10 mL.    IV FLUIDS:  2000 mL.    URINE OUTPUT:  250 mL.      JEWELL/MATT  dd: 03/13/2017 08:45:51 (CDT)  td: 03/13/2017 13:11:14 (CDT)  Doc ID   #[id number]  Job ID #[id number]    CC:

## 2017-03-13 NOTE — PLAN OF CARE
Problem: Patient Care Overview  Goal: Plan of Care Review  Outcome: Ongoing (interventions implemented as appropriate)    03/13/17 1700   Coping/Psychosocial   Plan Of Care Reviewed With patient   Patient is POD #1 of a left oophorectomy.  Pain controlled by current pain medication.  Was able to eat some chicken noodle soup, a sprite, apple juice, and ice cream with no complaints of nausea.  5 lap sites noted with steri-strips.  side rails up x2; call bell in place; bed in lowest, locked position; skid proof socks on; no evidence of skin breakdown; care plan explained to patient; no additional complaints at this time.

## 2017-03-13 NOTE — OR NURSING
All DaVinci instruments inspected before case by Amanda Brennan .  All instruments appear to be intact.

## 2017-03-13 NOTE — H&P
"Subjective:        Patient ID: Josseline Harper is a 41 y.o. female.     Chief Complaint: Virilising Lt Ovarian Tumor (Consult)     HPI      Saw Dr. Berny Campbell, Endocrinologist, because of increasing facial hair. Thought to be due initially to PCO but testosterone levels did not suppress with Prempro or dexamethasone. Work up to date has included a CT scan that showed normal ovaries. Octreotide study was negative.  Pelvic US showed bilateral ovarian cysts consistent with PCO.     Then had PET scan done at [facility] in Sept 2016 that shows a hypermetabolic area in the left ovary with SUV of 5.1.      Referred for further management.   Patient wants to preserve fertility.      Pap: 2015: normal. 1 prior abnormal with follow up pap being negative.   Mammogram: Oct 2016: normal.      Review of Systems   Constitutional: Negative for chills, fatigue and fever.   Respiratory: Negative for cough, shortness of breath and wheezing.   Cardiovascular: Negative for chest pain, palpitations and leg swelling.   Gastrointestinal: Negative for abdominal pain, constipation, diarrhea, nausea and vomiting.   Genitourinary: Negative for difficulty urinating, dysuria, frequency, genital sores, hematuria, urgency, vaginal bleeding, vaginal discharge and vaginal pain.   Neurological: Negative for weakness.   Hematological: Negative for adenopathy. Does not bruise/bleed easily.   Psychiatric/Behavioral: The patient is not nervous/anxious.       Objective:     Per Dr. Molina's last note:       Visit Vitals    /79    Pulse 75    Ht 5' 2" (1.575 m)    Wt 78.1 kg (172 lb 2.9 oz)    BMI 31.49 kg/m2         Physical Exam   Constitutional: She is oriented to person, place, and time. She appears well-developed and well-nourished.   HENT:   Head: Normocephalic and atraumatic.   Eyes: No scleral icterus.   Neck: Neck supple. No tracheal deviation present. No thyroid mass and no thyromegaly present.   Cardiovascular: Normal " rate and regular rhythm.   Pulmonary/Chest: Effort normal and breath sounds normal. She has no wheezes.   Abdominal: Soft. She exhibits no distension and no mass. There is no hepatosplenomegaly. There is no tenderness. There is no rebound and no guarding.   Genitourinary:   Genitourinary Comments: Bimanual exam:  Vulva: no lesions. Normal appearance  Urethra: Normal size and location. No lesions  Bladder: No masses or tenderness.  Vagina: normal mucosa. No lesion  Cervix: normal   Uterus: normal  Adnexa: no masses.  Rectovaginal: No posterior cul de sac thickening or nodularity.  Rectal: no masses. Nontender. Normal tone.     Musculoskeletal: She exhibits no edema or tenderness.   Lymphadenopathy:   She has no cervical adenopathy.   She has no axillary adenopathy.   Right: No inguinal and no supraclavicular adenopathy present.   Left: No inguinal and no supraclavicular adenopathy present.   Neurological: She is alert and oriented to person, place, and time.   Skin: Skin is warm and dry.   Psychiatric: She has a normal mood and affect. Her behavior is normal. Judgment and thought content normal.       Assessment:       1. Virilization    2. Ovarian mass, left        Plan:     - Recent pelvic u/s showed a nonspecific 1.6 cm solid lesion in the left ovary.  - Patient on call to the OR for RALO with plan to keep L fallopian tube due to future fertility desires.  - No changes have been made since last seen in clinic.  - Consents have been signed. All questions and concerns were answered.       Rukhsana Loco PA-C  Gynecologic Oncology  [phone]

## 2017-03-13 NOTE — ANESTHESIA RELEASE NOTE
"Anesthesia Release from PACU Note    Patient: Josseline Harper    Procedure(s) Performed: Procedure(s) (LRB):  XI ROBOT ASSISTED LAPAROSCOPIC OOPHERECTOMY (Left)    Anesthesia type: general    Post pain: Adequate analgesia    Post assessment: no apparent anesthetic complications, tolerated procedure well and no evidence of recall    Last Vitals:   Visit Vitals    /66 (BP Location: Left arm, Patient Position: Lying, BP Method: Automatic)    Pulse 71    Temp 36.3 °C (97.4 °F) (Axillary)    Resp 19    Ht 5' 2" (1.575 m)    Wt 81.6 kg (180 lb)    LMP     SpO2 96%    Breastfeeding No    BMI 32.92 kg/m2       Post vital signs: stable    Level of consciousness: awake, alert  and oriented    Nausea/Vomiting: no nausea/no vomiting    Complications: none    Airway Patency: patent    Respiratory: unassisted, spontaneous ventilation, room air    Cardiovascular: stable and blood pressure at baseline    Hydration: euvolemic  "

## 2017-03-13 NOTE — OPERATIVE NOTE ADDENDUM
Certification of Assistant at Surgery       Surgery Date: 3/13/2017     Participating Surgeons:  Surgeon(s) and Role:     * Morteza Molina MD - Primary     * Rukhsana Loco PA-C - Resident - Assisting     * THANIA Amin MD - Resident - Assisting     * Tino Monroe MD - Resident - Assisting    Procedures:  Procedure(s) (LRB):  XI ROBOT ASSISTED LAPAROSCOPIC OOPHERECTOMY (Left)    Assistant Surgeon's Certification of Necessity:  I understand that section 1842 (b) (6) (d) of the Social Security Act generally prohibits Medicare Part B reasonable charge payment for the services of assistants at surgery in teaching hospitals when qualified residents are available to furnish such services. I certify that the services for which payment is claimed were medically necessary, and that no qualified resident was available to perform the services. I further understand that these services are subject to post-payment review by the Medicare carrier.      Rukhsana Loco PA-C    03/13/2017  9:09 AM      MORTEZA MOLINA MD

## 2017-03-13 NOTE — BRIEF OP NOTE
[facility]  Brief Operative Note    SUMMARY     Surgery Date: 3/13/2017     Surgeon(s) and Role:     * Jake Molina MD - Primary     * Rukhsana Loco PA-C - Resident - Assisting     * THANIA Amin MD - Resident - Assisting     * Tino Monroe MD - Resident - Assisting        Pre-op Diagnosis:  Virilization [E25.9]  Left ovarian mass   Post-op Diagnosis:  Post-Op Diagnosis Codes:     * Virilization [E25.9]  Left ovarian mass    Procedure(s) (LRB):  XI ROBOT ASSISTED LAPAROSCOPIC OOPHERECTOMY (Left)    Anesthesia: General    Description of Procedure: removal of left ovary     Description of the findings of the procedure: slightly enlarged left ovary. Normal left fallopian tube and right tube and ovary. Uterus normal.     Estimated Blood Loss: 10 ml   Total Fluids: 2000 ml   Urine Output: 250 ml            Specimens:   Specimen (12h ago through future)    Start     Ordered    03/13/17 0832  Specimen to Pathology - Surgery  Once     Comments:  1. Left ovary- permanent, no preservatives, sent to pathology to biobank    03/13/17 0883

## 2017-03-14 ENCOUNTER — NURSE TRIAGE (OUTPATIENT)
Dept: ADMINISTRATIVE | Facility: CLINIC | Age: 42
End: 2017-03-14

## 2017-03-14 VITALS
RESPIRATION RATE: 16 BRPM | HEIGHT: 62 IN | WEIGHT: 180 LBS | BODY MASS INDEX: 33.13 KG/M2 | SYSTOLIC BLOOD PRESSURE: 117 MMHG | TEMPERATURE: 99 F | OXYGEN SATURATION: 95 % | HEART RATE: 77 BPM | DIASTOLIC BLOOD PRESSURE: 62 MMHG

## 2017-03-14 PROCEDURE — 25000003 PHARM REV CODE 250: Performed by: OBSTETRICS & GYNECOLOGY

## 2017-03-14 RX ORDER — IBUPROFEN 600 MG/1
600 TABLET ORAL EVERY 6 HOURS PRN
Qty: 30 TABLET | Refills: 1 | Status: SHIPPED | OUTPATIENT
Start: 2017-03-14 | End: 2017-03-21 | Stop reason: ALTCHOICE

## 2017-03-14 RX ORDER — HYDROCODONE BITARTRATE AND ACETAMINOPHEN 5; 325 MG/1; MG/1
1 TABLET ORAL
Qty: 20 TABLET | Refills: 0 | Status: SHIPPED | OUTPATIENT
Start: 2017-03-14 | End: 2017-03-21 | Stop reason: ALTCHOICE

## 2017-03-14 RX ADMIN — HYDROCODONE BITARTRATE AND ACETAMINOPHEN 1 TABLET: 5; 325 TABLET ORAL at 11:03

## 2017-03-14 RX ADMIN — DEXTROSE MONOHYDRATE, SODIUM CHLORIDE, AND POTASSIUM CHLORIDE: 50; 4.5; 1.49 INJECTION, SOLUTION INTRAVENOUS at 07:03

## 2017-03-14 RX ADMIN — HYDROCODONE BITARTRATE AND ACETAMINOPHEN 1 TABLET: 5; 325 TABLET ORAL at 07:03

## 2017-03-14 RX ADMIN — DOCUSATE SODIUM 100 MG: 100 CAPSULE, LIQUID FILLED ORAL at 07:03

## 2017-03-14 NOTE — NURSING
ROADTEST  O2- Pt on room air sating %  Activity-Pt ambulates independently  Devices- Pt not being sent home on any devices.   Tolerating-Pt tolerating PO diet and medication.  Elimination-Pt voiding and having bowel movements independently.  Self Care- Pt able to do personal hygiene independently  Teaching- Pt instructed on when to take home meds.     Pt's peripheral IV removed. Cath tip intact. Pt tolerated well. AVS and prescriptions given to pt. All questions answered. Pt verbalized understanding. Pt awaiting transport at this time.
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
## 2017-03-14 NOTE — PROGRESS NOTES
Progress Note  Gynecological Oncology      Admit Date: 3/13/2017  Post-operative Day: 1 Day Post-Op  Hospital Day: 2    SUBJECTIVE:     Josseline Harper is a 41 y.o. 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     Ms. Harper is doing well this afternoon. She reports that she has tolerated a grilled chicken sandwich and gumbo with no nausea or vomiting. She reports that her pain is well controlled with PO pain medication. She is ambulating and voiding with no nausea or vomiting. She has not passed flatus nor has she had a bowel movement since surgery.     Scheduled Meds:   docusate sodium  100 mg Oral Daily     Continuous Infusions:   dextrose 5 % and 0.45 % NaCl with KCl 20 mEq 100 mL/hr at 03/13/17 2026     PRN Meds:diphenhydrAMINE, hydrocodone-acetaminophen 5-325mg, HYDROmorphone, ibuprofen, ondansetron, promethazine (PHENERGAN) IVPB, simethicone    Review of patient's allergies indicates:  No Known Allergies    OBJECTIVE:     Vital Signs (Most Recent)  Temp: 98.2 °F (36.8 °C) (03/14/17 0545)  Pulse: 70 (03/14/17 0545)  Resp: 16 (03/14/17 0545)  BP: (!) 100/56 (03/14/17 0545)  SpO2: 95 % (03/14/17 0545)    Temperature Range Min/Max (Last 24H):  Temp:  [97 °F (36.1 °C)-98.3 °F (36.8 °C)]     Vital Signs Range (Last 24H):  Temp:  [97 °F (36.1 °C)-98.3 °F (36.8 °C)]   Pulse:  [70-89]   Resp:  [16-23]   BP: (100-152)/(56-70)   SpO2:  [93 %-100 %]     I & O (Last 24H):    Intake/Output Summary (Last 24 hours) at 03/14/17 0615  Last data filed at 03/14/17 0000   Gross per 24 hour   Intake             4430 ml   Output             1650 ml   Net             2780 ml     Physical Exam:  General: well developed, well nourished, no distress  Lungs:  clear to auscultation bilaterally and normal respiratory effort  Heart: regular rate and rhythm, S1, S2 normal, no murmur, click, rub or gallop  Abdomen: soft, non-tender non-distended; bowel sounds normal; no masses,  no  organomegaly, steri strips in place  Wound/Incision: clean, dry, intact  Extremities: no cyanosis or edema, or clubbing    Lines/Drains:       Peripheral IV - Single Lumen 03/13/17 0616 Left Forearm (Active)   Site Assessment Dry;Clean;Intact;No redness;No swelling;No drainage;No warmth 3/13/2017  9:09 AM   Line Status Saline locked 3/13/2017  9:09 AM   Dressing Status Clean;Dry;Intact 3/13/2017  9:09 AM   Dressing Intervention New dressing 3/13/2017  6:16 AM   Number of days:0            Peripheral IV - Single Lumen 03/13/17 0719 Left Wrist (Active)   Site Assessment Clean;Dry;Intact;No redness;No swelling;No warmth;No drainage 3/13/2017  9:09 AM   Line Status Infusing 3/13/2017  9:09 AM   Dressing Status Clean;Intact;Dry 3/13/2017  9:09 AM   Number of days:0            Urethral Catheter 03/13/17 0720 Non-latex 16 Fr. (Active)   Site Assessment Clean;Intact 3/13/2017  9:09 AM   Collection Container Urimeter 3/13/2017  9:09 AM   Securement Method secured to top of thigh w/ adhesive device 3/13/2017  9:09 AM   Number of days:0       ASSESSMENT/PLAN:     Active Hospital Problems    Diagnosis  POA    *S/P robot-assisted Left Oophorectomy [Z98.890]  Not Applicable    Pelvic mass in female [R19.00]  Yes    S/P left oophorectomy [Z90.721]  Not Applicable    Virilization [E25.9]  Yes      Resolved Hospital Problems    Diagnosis Date Resolved POA   No resolved problems to display.     Assessment: Josseline Harper is a 41 y.o. 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     Plan:   1. s/p Robotic Assisted Left Oophorectomy  - Ibuprofen/Norco PRN pain  - Zofran/Phenergan PRN nausea or vomiting  - Regular Diet  - Encourage ambulation  - IS at bedside  - No labs indicated at this time  - No ansari in place  - Simethicone PRN   - Benadryl PRN itching  - JORGE hoses and SCDs in place      2. Bilateral Lower extremity Edema  - no issues    3. Ptosis of Bilateral Eye Lids  - no  issues at this time    Preventive Measures:  DVT prophylaxis    Dispo: Patient has met all discharge milestones thus will be discharged this morning after staff rounds.    Tino Monroe MD  PGY 3 OB/GYN  [phone]

## 2017-03-14 NOTE — PLAN OF CARE
Problem: Patient Care Overview  Goal: Plan of Care Review  Outcome: Ongoing (interventions implemented as appropriate)  Pt ax04. Mother at bedside. Vital signs taken, within normal limits. Pt c/o os pain hydromorphone given 05mg. Patient has 5 lap sites with steri strips. Continuous fluid of dextrose 5% and 45% NACL with KCI 20meq infusing at 100ml/hr. Pt free of fall and or injury at this time. Pt and mother denies having any questions, issues, and concerns at this time. Call bed within reach, bed locked in low position.  Will continue to monitor.

## 2017-03-14 NOTE — DISCHARGE SUMMARY
[facility]  Obstetrics & Gynecology  Discharge Summary    Patient Name: Josseline Harper  MRN: [id number]  Admission Date: 3/13/2017  Hospital Length of Stay: 0 days  Discharge Date and Time:  03/14/2017 6:59 AM  Attending Physician: Jake Molina MD   Discharging Provider: MARCELL Amin MD  Primary Care Provider: Guerita Peter MD    Hospital Course: Patient presented for scheduled procedure. Patient was passed back to OR for Robotic assisted left oophorectomy 2/2 virilizing tumor. Please see OP note for further details. Tolerated procedure well and patient was taken to recovery in a stable condition. She remained in the hospital overnight for observation. Her post-operative course was uncomplicated. Prior to discharge patient was able to void, ambulate, tolerate PO and pain was well controlled with PO meds. Patient was given routine post-op instructions for which patient voiced understanding. Patient was subsequently discharged home.      Procedure(s) (LRB):  XI ROBOT ASSISTED LAPAROSCOPIC OOPHERECTOMY (Left)     Consults:     Significant Diagnostic Studies: None    Pending Diagnostic Studies:     None        Final Active Diagnoses:    Diagnosis Date Noted POA    PRINCIPAL PROBLEM:  S/P robot-assisted Left Oophorectomy [Z98.890] 03/13/2017 Not Applicable    Pelvic mass in female [R19.00] 03/13/2017 Yes    S/P left oophorectomy [Z90.721] 03/13/2017 Not Applicable    Virilization [E25.9] 11/14/2016 Yes      Problems Resolved During this Admission:    Diagnosis Date Noted Date Resolved POA        Discharged Condition: good    Disposition: Home or Self Care    Follow Up:  Follow-up Information     Follow up with Jake Molina MD. Schedule an appointment as soon as possible for a visit in 4 weeks.    Specialty:  Gynecologic Oncology    Why:  Post op    Contact information:    [address] [facility] [id number] [phone]          Patient Instructions:     Diet general      Activity as tolerated     Call MD for:  temperature >100.4     Call MD for:  persistent nausea and vomiting or diarrhea     Call MD for:  severe uncontrolled pain     Call MD for:  redness, tenderness, or signs of infection (pain, swelling, redness, odor or green/yellow discharge around incision site)     Call MD for:  difficulty breathing or increased cough     Call MD for:  severe persistent headache     Call MD for:  increased confusion or weakness     Type And Screen Preop   Standing Status: Future  Standing Exp. Date: 03/07/18     Type And Screen Preop   Standing Status: Future  Standing Exp. Date: 05/11/18       Medications:  Reconciled Home Medications:   Current Discharge Medication List      START taking these medications    Details   hydrocodone-acetaminophen 5-325mg (NORCO) 5-325 mg per tablet Take 1 tablet by mouth every 4 to 6 hours as needed.  Qty: 20 tablet, Refills: 0    Associated Diagnoses: S/P robot-assisted surgical procedure      ibuprofen (ADVIL,MOTRIN) 600 MG tablet Take 1 tablet (600 mg total) by mouth every 6 (six) hours as needed.  Qty: 30 tablet, Refills: 1    Associated Diagnoses: S/P robot-assisted surgical procedure         CONTINUE these medications which have NOT CHANGED    Details   artificial tears ointment (REFRESH P.M.) Oint nightly as needed.       ASCORBATE CALCIUM (VITAMIN C ORAL) Take by mouth.      DOCOSAHEXANOIC ACID/EPA (FISH OIL ORAL) Take by mouth.      ERGOCALCIFEROL, VITAMIN D2, (VITAMIN D ORAL) Take by mouth.      estrogen, conjugated,-medroxyprogesterone (PREMPRO) 0.625-5 mg per tablet Take 1 tablet by mouth once daily.  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             MARCELL Amin MD  Obstetrics & Gynecology  [facility]

## 2017-03-15 ENCOUNTER — TELEPHONE (OUTPATIENT)
Dept: GYNECOLOGIC ONCOLOGY | Facility: CLINIC | Age: 42
End: 2017-03-15

## 2017-03-15 NOTE — PHYSICIAN QUERY
PT Name: Josseline Harper  MR #: [id number]     Physician Query Form - Documentation Clarification    Reviewer Eladia [email or web address]     This form is a permanent document in the medical record.     Query Date: March 15, 2017  By submitting this query, we are merely seeking further clarification of documentation to reflect the severity of illness of your patient. Please utilize your independent clinical judgment when addressing the question(s) below.    (The Medical record reflects the following:)      Supporting Clinical Findings Location in Medical Record     CONSISTENT WITH A LEYDIG CELL TUMOR       path                                                                                      Doctor Jesse,  Do you agree with the above clinical findings of CONSISTENT WITH A LEYDIG CELL TUMOR from the path?    Physician Use Only    [ ] yes  [ ] no                                                                                                                           [  ] Unable to determine

## 2017-03-16 ENCOUNTER — TELEPHONE (OUTPATIENT)
Dept: GYNECOLOGIC ONCOLOGY | Facility: CLINIC | Age: 42
End: 2017-03-16

## 2017-03-16 DIAGNOSIS — H57.89 RED EYES: ICD-10-CM

## 2017-03-16 NOTE — TELEPHONE ENCOUNTER
----- Message from Elba Aguila sent at 3/16/2017  2:50 PM CDT -----  Jsoseline Harper is having eye issues (rt)-pt can be reached at        [facility]# [id number]

## 2017-03-16 NOTE — TELEPHONE ENCOUNTER
Spoke with to boyfriend, he states pt eye has been blood shot red since surgery on 3/13/17. He states he spoke with Dr. Molina on Tuesday, was informed it could have been the way the pt was positioned during surgery. They were informed by a nurse to keep an eye on it and use ice packs on her eye. Today is Thursday, there is no change. There are no complaints of pain and vision problems. Advised pt and boyfriend Dr. Molina is in clinic, will forward message to physician. They voiced understanding

## 2017-03-16 NOTE — TELEPHONE ENCOUNTER
Spoke with guillermo. Per Dr. Molina, he is aware it will take time for the redness to heal but pt can see an ophthalmologist close to home or [facility]. Guillermo wants to know how long before should the pt go see an ophthalmologist. Per dr. Molina they can make an appt to see eye doctor. Guillermo voiced understanding

## 2017-03-17 ENCOUNTER — TELEPHONE (OUTPATIENT)
Dept: GYNECOLOGIC ONCOLOGY | Facility: CLINIC | Age: 42
End: 2017-03-17

## 2017-03-17 NOTE — TELEPHONE ENCOUNTER
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   She has had a problem with red eyes since surgery. No changes in vision.   She saw an opthalmologist today and he did not find any concerns. I explained to her that the red eyes is due to the trendelenburg position for robotic surgery.

## 2017-04-12 ENCOUNTER — OFFICE VISIT (OUTPATIENT)
Dept: GYNECOLOGIC ONCOLOGY | Facility: CLINIC | Age: 42
End: 2017-04-12
Payer: COMMERCIAL

## 2017-04-12 VITALS
WEIGHT: 173.5 LBS | SYSTOLIC BLOOD PRESSURE: 113 MMHG | HEART RATE: 71 BPM | BODY MASS INDEX: 31.93 KG/M2 | DIASTOLIC BLOOD PRESSURE: 80 MMHG | HEIGHT: 62 IN

## 2017-04-12 DIAGNOSIS — E25.9 VIRILIZATION: ICD-10-CM

## 2017-04-12 DIAGNOSIS — Z98.890 S/P ROBOT-ASSISTED SURGICAL PROCEDURE: Primary | ICD-10-CM

## 2017-04-12 PROCEDURE — 99024 POSTOP FOLLOW-UP VISIT: CPT | Mod: S$GLB,,, | Performed by: OBSTETRICS & GYNECOLOGY

## 2017-04-12 PROCEDURE — 99999 PR PBB SHADOW E&M-EST. PATIENT-LVL III: CPT | Mod: PBBFAC,,, | Performed by: OBSTETRICS & GYNECOLOGY

## 2017-04-12 NOTE — MR AVS SNAPSHOT
[facility] - GYN Oncology  [address]  [facility] [address]  Phone: [phone]                  Josseline Harper   2017 1:30 PM   Office Visit    Description:  Female : 1975   Provider:  Jake Molina MD   Department[facility]  [facility] - GYN Oncology           Reason for Visit     Post-op Evaluation                To Do List           Future Appointments        Provider Department Dept Phone    2017 11:00 AM Berny Campbell MD [place] - Endo/Diabetes [phone]      Goals (5 Years of Data)     None      [facility]     [facility] On [facility] Nurse Care Line -  Assistance  Unless otherwise directed by your provider, please contact [facility], our nurse care line that is available for  assistance.     Registered nurses in the [facility] On Call Center provide: appointment scheduling, clinical advisement, health education, and other advisory services.  Call: [phone] (toll free)               Medications           Message regarding Medications     Verify the changes and/or additions to your medication regime listed below are the same as discussed with your clinician today.  If any of these changes or additions are incorrect, please notify your healthcare provider.             Verify that the below list of medications is an accurate representation of the medications you are currently taking.  If none reported, the list may be blank. If incorrect, please contact your healthcare provider. Carry this list with you in case of emergency.           Current Medications     artificial tears ointment (REFRESH P.M.) Oint nightly as needed.     ASCORBATE CALCIUM (VITAMIN C ORAL) Take by mouth.    DOCOSAHEXANOIC ACID/EPA (FISH OIL ORAL) Take by mouth.    ERGOCALCIFEROL, VITAMIN D2, (VITAMIN D ORAL) Take by mouth.    levoFLOXacin (LEVAQUIN) 500 MG tablet Take 1 tablet (500 mg total) by mouth once daily.    PV W-O BRANDAN/FERROUS FUMARATE/FA (M-VIT ORAL) Take by mouth.          "  Clinical Reference Information           Your Vitals Were     BP Pulse Height Weight BMI    113/80 71 5' 2" (1.575 m) 78.7 kg (173 lb 8 oz) 31.73 kg/m2      Blood Pressure          Most Recent Value    BP  113/80      Allergies as of 4/12/2017     No Known Allergies      Immunizations Administered on Date of Encounter - 4/12/2017     None      Language Assistance Services     ATTENTION: Language assistance services are available, free of charge. Please call [phone].      ATENCIÓN: Si habla tesha, tiene a cha disposición servicios gratuitos de asistencia lingüística. Llame al [phone].     CHÚ Ý: N?u b?n nói Ti?ng Vi?t, có các d?ch v? h? tr? ngôn ng? mi?n phí dành cho b?n. G?i s? [phone].         [facility] complies with applicable Federal civil rights laws and does not discriminate on the basis of race, color, national origin, age, disability, or sex.        "

## 2017-04-12 NOTE — PROGRESS NOTES
Subjective:       Patient ID: Josseline Harper is a 41 y.o. female.    Chief Complaint: Post-op Evaluation (Robotic Assisted left Oophorectomy )    HPI   Patient presents today for post op evaluation s/p robotic assisted LSO. 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 She was seen last week by her PCP for UTI and started on abx, symptoms have improved. She saw opthalmologist after surgery for conjunctival injection which may have been due to steep trendelenburg and she states this has improved. She has a follow up visit with opthalmology tomorrow. Overall has been doing well post operatively and is without complaints.     Treatment history:  Saw Dr. Berny Campbell, Endocrinologist,  because of increasing facial hair. Thought to be due initially to PCO but testosterone levels did not suppress with Prempro or dexamethasone. Work up to date has included a CT scan that showed normal ovaries. Octreotide study was negative.  Pelvic US showed bilateral ovarian cysts consistent with PCO.    Then had PET scan done at [facility] in Sept 2016 that shows a hypermetabolic area in the left ovary with SUV of 5.1.     Pap: 2015: normal. 1 prior abnormal with follow up pap being negative.   Mammogram: Oct 2016: normal.     Review of Systems   Constitutional: Negative for chills, fatigue and fever.   Respiratory: Negative for cough, shortness of breath and wheezing.    Cardiovascular: Negative for chest pain, palpitations and leg swelling.   Gastrointestinal: Negative for abdominal pain, constipation, diarrhea, nausea and vomiting.   Genitourinary: Negative for difficulty urinating, dysuria, frequency, genital sores, hematuria, urgency, vaginal bleeding, vaginal discharge and vaginal pain.   Neurological: Negative for weakness.   Hematological: Negative for adenopathy. Does not bruise/bleed easily.   Psychiatric/Behavioral: The patient is not nervous/anxious.        Objective:       /80  Pulse 71   "Ht 5' 2" (1.575 m)  Wt 78.7 kg (173 lb 8 oz)  BMI 31.73 kg/m2    Physical Exam   Constitutional: She is oriented to person, place, and time. She appears well-developed and well-nourished.   HENT:   Head: Normocephalic and atraumatic.   Eyes: Right conjunctiva is injected. Left conjunctiva is injected. No scleral icterus.   Neck: Neck supple. No tracheal deviation present. No thyroid mass and no thyromegaly present.   Cardiovascular: Normal rate and regular rhythm.    Pulmonary/Chest: Effort normal and breath sounds normal. She has no wheezes.   Abdominal: Soft. She exhibits no distension and no mass. There is no hepatosplenomegaly. There is no tenderness. There is no rebound and no guarding.   Healing trocar incision sites   Genitourinary:   Genitourinary Comments: Bimanual exam:  Not performed   Musculoskeletal: She exhibits no edema or tenderness.   Lymphadenopathy:     She has no cervical adenopathy.     She has no axillary adenopathy.        Right: No inguinal and no supraclavicular adenopathy present.        Left: No inguinal and no supraclavicular adenopathy present.   Neurological: She is alert and oriented to person, place, and time.   Skin: Skin is warm and dry.   Psychiatric: She has a normal mood and affect. Her behavior is normal. Judgment and thought content normal.       Assessment:       1. S/P robot-assisted Left Oophorectomy    2. Virilization        Plan:     Informed the patient about her pathology. Discussed that this is a benign tumor that secretes testosterone. Also informed her that her facial hair may start go away, but she may or may not have full regrowth of head hair. She is to follow up with endocrinologist in 2 weeks to have hormone level testing. Informed her that because she had benign pathology she does not need to follow up with Dr. Molina. She can follow up with general gyn for routine well woman visits.       Patient seen with Rukhsana Loco PA-C and I agree with her " findings.   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   Will follow up with Dr. Campbell for her testosterone level. She has an appt with him 4/25/2017.     Conjunctiva of bilateral eyes are slightly erythematous. This has improved. She has a follow up appt with opthalmology.     MORTEZA MOLINA MD

## 2017-04-14 ENCOUNTER — PATIENT MESSAGE (OUTPATIENT)
Dept: GYNECOLOGIC ONCOLOGY | Facility: CLINIC | Age: 42
End: 2017-04-14

## 2017-04-25 ENCOUNTER — LAB VISIT (OUTPATIENT)
Dept: LAB | Facility: HOSPITAL | Age: 42
End: 2017-04-25
Attending: INTERNAL MEDICINE
Payer: COMMERCIAL

## 2017-04-25 ENCOUNTER — OFFICE VISIT (OUTPATIENT)
Dept: ENDOCRINOLOGY | Facility: CLINIC | Age: 42
End: 2017-04-25
Payer: COMMERCIAL

## 2017-04-25 VITALS
BODY MASS INDEX: 31.8 KG/M2 | RESPIRATION RATE: 18 BRPM | HEART RATE: 71 BPM | SYSTOLIC BLOOD PRESSURE: 103 MMHG | WEIGHT: 172.81 LBS | DIASTOLIC BLOOD PRESSURE: 77 MMHG | HEIGHT: 62 IN

## 2017-04-25 DIAGNOSIS — Z90.721 S/P LEFT OOPHORECTOMY: ICD-10-CM

## 2017-04-25 DIAGNOSIS — E55.9 HYPOVITAMINOSIS D: ICD-10-CM

## 2017-04-25 DIAGNOSIS — L68.0 HIRSUTISM: Chronic | ICD-10-CM

## 2017-04-25 DIAGNOSIS — R03.0 PREHYPERTENSION: Chronic | ICD-10-CM

## 2017-04-25 DIAGNOSIS — E34.9 FEMALE HYPERTESTOSTERONEMIA: Chronic | ICD-10-CM

## 2017-04-25 DIAGNOSIS — D49.59 OVARIAN TUMOR: ICD-10-CM

## 2017-04-25 DIAGNOSIS — Z90.721 S/P LEFT OOPHORECTOMY: Primary | ICD-10-CM

## 2017-04-25 LAB
25(OH)D3+25(OH)D2 SERPL-MCNC: 19 NG/ML
CHOLEST/HDLC SERPL: 3.8 {RATIO}
DHEA-S SERPL-MCNC: 174.3 UG/DL
ESTRADIOL SERPL-MCNC: 102 PG/ML
FSH SERPL-ACNC: 3.1 MIU/ML
HDL/CHOLESTEROL RATIO: 26.4 %
HDLC SERPL-MCNC: 193 MG/DL
HDLC SERPL-MCNC: 51 MG/DL
LDLC SERPL CALC-MCNC: 106.6 MG/DL
LH SERPL-ACNC: 1.2 MIU/ML
NONHDLC SERPL-MCNC: 142 MG/DL
PROGEST SERPL-MCNC: 9.1 NG/ML
TRIGL SERPL-MCNC: 177 MG/DL

## 2017-04-25 PROCEDURE — 1160F RVW MEDS BY RX/DR IN RCRD: CPT | Mod: S$GLB,,, | Performed by: INTERNAL MEDICINE

## 2017-04-25 PROCEDURE — 36415 COLL VENOUS BLD VENIPUNCTURE: CPT | Mod: PO

## 2017-04-25 PROCEDURE — 82672 ASSAY OF ESTROGEN: CPT

## 2017-04-25 PROCEDURE — 83002 ASSAY OF GONADOTROPIN (LH): CPT

## 2017-04-25 PROCEDURE — 99214 OFFICE O/P EST MOD 30 MIN: CPT | Mod: S$GLB,,, | Performed by: INTERNAL MEDICINE

## 2017-04-25 PROCEDURE — 82670 ASSAY OF TOTAL ESTRADIOL: CPT

## 2017-04-25 PROCEDURE — 84270 ASSAY OF SEX HORMONE GLOBUL: CPT

## 2017-04-25 PROCEDURE — 84144 ASSAY OF PROGESTERONE: CPT

## 2017-04-25 PROCEDURE — 99999 PR PBB SHADOW E&M-EST. PATIENT-LVL III: CPT | Mod: PBBFAC,,, | Performed by: INTERNAL MEDICINE

## 2017-04-25 PROCEDURE — 82626 DEHYDROEPIANDROSTERONE: CPT

## 2017-04-25 PROCEDURE — 82627 DEHYDROEPIANDROSTERONE: CPT

## 2017-04-25 PROCEDURE — 83001 ASSAY OF GONADOTROPIN (FSH): CPT

## 2017-04-25 PROCEDURE — 82157 ASSAY OF ANDROSTENEDIONE: CPT

## 2017-04-25 PROCEDURE — 80061 LIPID PANEL: CPT

## 2017-04-25 PROCEDURE — 86316 IMMUNOASSAY TUMOR OTHER: CPT

## 2017-04-25 PROCEDURE — 82306 VITAMIN D 25 HYDROXY: CPT

## 2017-04-25 NOTE — PROGRESS NOTES
Subjective:      Patient ID: Josseline Harper is a 42 y.o. female.    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  The extensive work up she had had done thus far suggest that this is due to a Left ovarian lesion visualized by PET scan (Details in media tab section) but not by other imaging modalities including pelvic USS, abdominopelvic CT or octreotide scan. She in addition has hypovitaminosis D, obesity, dysmetabolic syndrome. The marked elevation in testosterone levels were essentially unchanged both with high dose dexamethasone suppression over 4 days and with 5 day course of Prempro.  The possibility of referral to the [facility] had been offered to the patient but she had elected not to have this done but rather wanted to weight her options before deciding to proceed with elective unilateral oophorectomy based on the imaging results thus far.      Patient has noticed excess facial hair in the last 2-3 yrs and has escalated in the last year. It is slowly getting worse.   She has not noticed the excess hair growth anywhere else.   The work up thus far which has included multiple pelvic USS, octreo scan, adrenal CT and extensive lab hormonal tests and karyoptype strongly suggest that the findings are the result of an ovarian based cause of testosterone excess but this is unlikely to be PCOS. The major possibilities are a small functional ovarian tumor or ovarian hyperthecosis.       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  Patient has still not had periods since she had the surgery. She has noticed that the degree of hair growth has reduced and so she is not needing to shave as frequently.    Review of Systems  "  Constitutional: Negative for fatigue and unexpected weight change.   HENT: Negative for trouble swallowing and voice change.    Eyes: Negative for visual disturbance.   Respiratory: Negative for cough and shortness of breath.    Cardiovascular: Negative for chest pain and palpitations.   Gastrointestinal: Negative for abdominal distention, nausea and vomiting.   Endocrine: Negative for polyuria.   Genitourinary: Negative for dysuria and menstrual problem.   Skin: Negative for color change, pallor and rash.   Neurological: Negative for dizziness and headaches.   Hematological: Does not bruise/bleed easily.   Psychiatric/Behavioral: Negative for sleep disturbance.       Objective:  /77  Pulse 71  Resp 18  Ht 5' 2" (1.575 m)  Wt 78.4 kg (172 lb 13.5 oz)  BMI 31.61 kg/m2     Physical Exam   Constitutional: She is oriented to person, place, and time. She appears well-developed and well-nourished. No distress.   Pleasant young lady. Looks older than chronologic age. Significantly less virilized and hirsuite than previous and  her voice continues to remain quite feminine.  Still has mild diffuse hypertrichosis especially involving extremeties. Not in any acute distress.   HENT:   Head: Normocephalic and atraumatic.   Eyes: Conjunctivae and EOM are normal. Pupils are equal, round, and reactive to light. No scleral icterus.   Neck: Normal range of motion. Neck supple. No JVD present.   Cardiovascular: Normal rate, regular rhythm and normal heart sounds.    Pulmonary/Chest: Effort normal and breath sounds normal. No respiratory distress. She has no wheezes.   Abdominal: Soft. There is no tenderness.   Mild anterior abdominal wall obesity.   Musculoskeletal: Normal range of motion. She exhibits no tenderness.   Neurological: She is alert and oriented to person, place, and time. No cranial nerve deficit.   Skin: Skin is warm and dry. No rash noted. She is not diaphoretic. No erythema. No pallor.   Mild " hypertrichosis as previously noted and mainly involving extremeties.   Psychiatric: She has a normal mood and affect. Her behavior is normal. Judgment and thought content normal.   Vitals reviewed.      Lab Review:     Results for WINNIE CH (MRN [id number]) as of 4/25/2017 11:20   Ref. Range 2/8/2017 11:22 2/8/2017 11:22 2/15/2017 10:35   Sodium Latest Ref Range: 136 - 145 mmol/L 139  141   Potassium Latest Ref Range: 3.5 - 5.1 mmol/L 4.4  3.9   Chloride Latest Ref Range: 95 - 110 mmol/L 101  104   CO2 Latest Ref Range: 23 - 29 mmol/L 30  30 (H)   Anion Gap Latest Ref Range: 8 - 16 mmol/L   7 (L)   BUN, Bld Latest Ref Range: 6 - 20 mg/dL 15  16   Creatinine Latest Ref Range: 0.5 - 1.4 mg/dL 1.01  1.0   BUN/Creatinine Ratio Latest Ref Range: 6 - 22 (calc) NOT APPLICABLE     eGFR if non African American Latest Ref Range: >60 mL/min/1.73 m^2 70  >60.0   eGFR if African American Latest Ref Range: >60 mL/min/1.73 m^2 81  >60.0   Glucose Latest Ref Range: 70 - 110 mg/dL 88  92   Calcium Latest Ref Range: 8.7 - 10.5 mg/dL 9.4 9.6 9.2   Alkaline Phosphatase Latest Ref Range: 33 - 115 U/L 73     Total Protein Latest Ref Range: 6.1 - 8.1 g/dL 7.2     Albumin Latest Ref Range: 3.6 - 5.1 g/dL 4.2     Albumin/Globulin Ratio Latest Ref Range: 1.0 - 2.5 (calc) 1.4     Total Bilirubin Latest Ref Range: 0.2 - 1.2 mg/dL 0.4     AST Latest Ref Range: 10 - 30 U/L 18     ALT Latest Ref Range: 6 - 29 U/L 18     Triglycerides Latest Ref Range: <150 mg/dL 97     Globulin, Total Latest Ref Range: 1.9 - 3.7 g/dL (calc) 3.0     Cholesterol Latest Ref Range: 125 - 200 mg/dL 181     HDL Latest Ref Range: > OR = 46 mg/dL 52     LDL Cholesterol Latest Ref Range: <130 mg/dL (calc) 110     Non HDL Chol. (LDL+VLDL) Latest Units: mg/dL (calc) 129     TSH Latest Units: mIU/L 1.06     PTH Latest Ref Range: 14 - 64 pg/mL 31         Assessment:     1. S/P left oophorectomy  Chromogranin A   2. Ovarian tumor  Estradiol    Progesterone     Luteinizing hormone    Estrogens, total    Follicle stimulating hormone    Chromogranin A   3. Hirsutism  Testosterone Panel    DHEA    DHEA-sulfate    Androstenedione    Estradiol    Progesterone    Luteinizing hormone    Estrogens, total    Follicle stimulating hormone    Chromogranin A   4. Female hypertestosteronemia  Testosterone Panel    DHEA    DHEA-sulfate    Androstenedione   5. Prehypertension  Vitamin D    Lipid panel   6. Hypovitaminosis D  Vitamin D        Regarding virilizing syndrome;S/p unilateral oophorectomy with Dr Jake ambrosio @ [facility] [place]. It is still very early post surgery to see cosmetic effects though she has noticed some subtle changes already. To obtain basic reproductive labs as detailed above.  Regarding ovarian function; for now will hold of on commencing OCPs with intent to allow remaining ovary restart ovarian function spontaneously. Once androgen levels are normalized though may consider clomid trial once she starts trying actively to get pregnant.    Regarding hirsutism; to continue cosmetic management of same for now.  Regarding hypovitaminosis d; to continue vitamin D repletion as before  Regarding dysmetabolic syndrome; Screening labs were essentially stable and thus we have not needed to commence active therapy for this at the moment.    Plan:     FFup in 4mths.

## 2017-04-27 LAB — ESTROGEN SERPL-MCNC: 331 PG/ML

## 2017-04-28 LAB
CGA SERPL-MCNC: 5 NG/ML
DHEA SERPL-MCNC: 2.33 NG/ML (ref 0.63–4.7)

## 2017-04-29 LAB
ALBUMIN SERPL-MCNC: 4.2 G/DL (ref 3.6–5.1)
SHBG SERPL-SCNC: 15 NMOL/L (ref 17–124)
TESTOST FREE SERPL-MCNC: 5.4 PG/ML (ref 0.2–5)
TESTOST SERPL-MCNC: 26 NG/DL (ref 2–45)
TESTOSTERONE.FREE+WB SERPL-MCNC: 10.3 NG/DL (ref 0.5–8.5)

## 2017-05-01 LAB — ANDROST SERPL-MCNC: 109 NG/DL

## 2017-10-11 ENCOUNTER — HOSPITAL ENCOUNTER (OUTPATIENT)
Dept: RADIOLOGY | Facility: HOSPITAL | Age: 42
Discharge: HOME | End: 2017-10-11
Attending: PHYSICAL MEDICINE & REHABILITATION
Payer: COMMERCIAL

## 2017-10-11 DIAGNOSIS — G89.29 CHRONIC BILATERAL LOW BACK PAIN WITHOUT SCIATICA: ICD-10-CM

## 2017-10-11 DIAGNOSIS — M54.50 CHRONIC BILATERAL LOW BACK PAIN WITHOUT SCIATICA: ICD-10-CM

## 2017-10-11 PROCEDURE — 72114 X-RAY EXAM L-S SPINE BENDING: CPT | Mod: 26,,, | Performed by: RADIOLOGY

## 2017-10-11 PROCEDURE — 72070 X-RAY EXAM THORAC SPINE 2VWS: CPT | Mod: 26,,, | Performed by: RADIOLOGY

## 2017-10-11 PROCEDURE — 72070 X-RAY EXAM THORAC SPINE 2VWS: CPT | Mod: TC

## 2017-10-11 PROCEDURE — 72114 X-RAY EXAM L-S SPINE BENDING: CPT | Mod: TC

## 2017-11-03 ENCOUNTER — OFFICE VISIT (OUTPATIENT)
Dept: ENDOCRINOLOGY | Facility: CLINIC | Age: 42
End: 2017-11-03
Payer: COMMERCIAL

## 2017-11-03 ENCOUNTER — LAB VISIT (OUTPATIENT)
Dept: LAB | Facility: HOSPITAL | Age: 42
End: 2017-11-03
Attending: PHYSICAL MEDICINE & REHABILITATION
Payer: COMMERCIAL

## 2017-11-03 VITALS
SYSTOLIC BLOOD PRESSURE: 115 MMHG | HEART RATE: 62 BPM | WEIGHT: 172.38 LBS | BODY MASS INDEX: 31.72 KG/M2 | DIASTOLIC BLOOD PRESSURE: 76 MMHG | HEIGHT: 62 IN | RESPIRATION RATE: 16 BRPM

## 2017-11-03 DIAGNOSIS — L68.0 HIRSUTISM: Chronic | ICD-10-CM

## 2017-11-03 DIAGNOSIS — N92.6 MENSTRUAL IRREGULARITY: ICD-10-CM

## 2017-11-03 DIAGNOSIS — E34.9 FEMALE HYPERTESTOSTERONEMIA: Chronic | ICD-10-CM

## 2017-11-03 DIAGNOSIS — E55.9 VITAMIN D DEFICIENCY: Chronic | ICD-10-CM

## 2017-11-03 DIAGNOSIS — D49.59 OVARIAN TUMOR: ICD-10-CM

## 2017-11-03 DIAGNOSIS — Z90.721 S/P LEFT OOPHORECTOMY: ICD-10-CM

## 2017-11-03 DIAGNOSIS — E25.9 VIRILIZATION: ICD-10-CM

## 2017-11-03 DIAGNOSIS — D49.59 OVARIAN TUMOR: Primary | ICD-10-CM

## 2017-11-03 LAB
25(OH)D3+25(OH)D2 SERPL-MCNC: 25 NG/ML
CA-I BLDV-SCNC: 1.24 MMOL/L
DHEA-S SERPL-MCNC: 140.7 UG/DL
ESTRADIOL SERPL-MCNC: 96 PG/ML
FSH SERPL-ACNC: 2.8 MIU/ML
LH SERPL-ACNC: 3.7 MIU/ML
PROGEST SERPL-MCNC: 4.8 NG/ML
PROLACTIN SERPL IA-MCNC: 9.7 NG/ML
PTH-INTACT SERPL-MCNC: 34 PG/ML

## 2017-11-03 PROCEDURE — 84146 ASSAY OF PROLACTIN: CPT

## 2017-11-03 PROCEDURE — 82679 ASSAY OF ESTRONE: CPT | Mod: 91

## 2017-11-03 PROCEDURE — 82627 DEHYDROEPIANDROSTERONE: CPT

## 2017-11-03 PROCEDURE — 82670 ASSAY OF TOTAL ESTRADIOL: CPT

## 2017-11-03 PROCEDURE — 82626 DEHYDROEPIANDROSTERONE: CPT

## 2017-11-03 PROCEDURE — 82677 ASSAY OF ESTRIOL: CPT

## 2017-11-03 PROCEDURE — 82330 ASSAY OF CALCIUM: CPT

## 2017-11-03 PROCEDURE — 84144 ASSAY OF PROGESTERONE: CPT

## 2017-11-03 PROCEDURE — 83002 ASSAY OF GONADOTROPIN (LH): CPT

## 2017-11-03 PROCEDURE — 83001 ASSAY OF GONADOTROPIN (FSH): CPT

## 2017-11-03 PROCEDURE — 99214 OFFICE O/P EST MOD 30 MIN: CPT | Mod: S$GLB,,, | Performed by: INTERNAL MEDICINE

## 2017-11-03 PROCEDURE — 99999 PR PBB SHADOW E&M-EST. PATIENT-LVL II: CPT | Mod: PBBFAC,,, | Performed by: INTERNAL MEDICINE

## 2017-11-03 PROCEDURE — 83970 ASSAY OF PARATHORMONE: CPT

## 2017-11-03 PROCEDURE — 82306 VITAMIN D 25 HYDROXY: CPT

## 2017-11-03 PROCEDURE — 84270 ASSAY OF SEX HORMONE GLOBUL: CPT

## 2017-11-03 PROCEDURE — 82672 ASSAY OF ESTROGEN: CPT

## 2017-11-03 PROCEDURE — 82670 ASSAY OF TOTAL ESTRADIOL: CPT | Mod: 91

## 2017-11-03 PROCEDURE — 36415 COLL VENOUS BLD VENIPUNCTURE: CPT | Mod: PO

## 2017-11-03 PROCEDURE — 80327 ANABOLIC STEROID 1 OR 2: CPT

## 2017-11-03 NOTE — PROGRESS NOTES
Subjective:      Patient ID: Josseline Harper is a 42 y.o. female.    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  The extensive work up she had had done thus far suggest that this is due to a Left ovarian lesion visualized by PET scan (Details in media tab section) but not by other imaging modalities including pelvic USS, abdominopelvic CT or octreotide scan. She in addition has hypovitaminosis D, obesity, dysmetabolic syndrome. The marked elevation in testosterone levels were essentially unchanged both with high dose dexamethasone suppression over 4 days and with 5 day course of Prempro.        Patient has noticed excess facial hair in the last 2-3 yrs and has escalated in the last year. It was slowly getting worse.  She had not noticed the excess hair growth anywhere else.   The work up thus far which has included multiple pelvic USS, octreo scan, adrenal CT and extensive lab hormonal tests and karyoptype strongly suggest that the findings are the result of an ovarian based cause of testosterone excess but this is unlikely to be PCOS. The major possibilities are a small functional ovarian tumor or ovarian hyperthecosis. PET scan revealed lesion in left ovary based on which she was referred to GYN for elective left oophorectomy.        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  Since the surgery her scalp hair has started to regrow and she is having to shave far less often than before.  Patient has started having periods since may this year. She is ffed by her GYN for gynecologic needs.      Review of Systems   Constitutional: Negative for fatigue and unexpected weight change.   HENT: Negative for trouble swallowing and voice  "change.    Eyes: Negative for visual disturbance.   Respiratory: Negative for cough and shortness of breath.    Cardiovascular: Negative for chest pain and palpitations.   Gastrointestinal: Negative for abdominal distention, nausea and vomiting.   Endocrine: Negative for polyuria.   Genitourinary: Negative for dysuria and menstrual problem.   Musculoskeletal: Negative for joint swelling.   Skin: Negative for color change, pallor and rash.   Neurological: Negative for dizziness and headaches.   Hematological: Does not bruise/bleed easily.   Psychiatric/Behavioral: Negative for sleep disturbance.       Objective: /76   Pulse 62   Resp 16   Ht 5' 2" (1.575 m)   Wt 78.2 kg (172 lb 6.4 oz)   BMI 31.53 kg/m²        Physical Exam   Constitutional: She is oriented to person, place, and time. She appears well-developed and well-nourished. No distress.   Pleasant young lady. Looks older than chronologic age. Significantly less virilized and mildly hirsuite (in chin and moustache areas) and  her voice continues to remain quite feminine.   Not in any acute distress.   HENT:   Head: Normocephalic and atraumatic.   Eyes: Conjunctivae and EOM are normal. Pupils are equal, round, and reactive to light. No scleral icterus.   Neck: Normal range of motion. Neck supple. No JVD present.   Cardiovascular: Normal rate, regular rhythm and normal heart sounds.    Pulmonary/Chest: Effort normal and breath sounds normal. No respiratory distress. She has no wheezes.   Abdominal: Soft. There is no tenderness.   Mild anterior abdominal wall obesity.   Musculoskeletal: Normal range of motion. She exhibits no tenderness.   Neurological: She is alert and oriented to person, place, and time. No cranial nerve deficit.   Skin: Skin is warm and dry. No rash noted. She is not diaphoretic. No erythema. No pallor.   Mild hypertrichosis as previously noted and mainly involving extremeties.   Psychiatric: She has a normal mood and affect. Her " behavior is normal. Judgment and thought content normal.   Vitals reviewed.      Lab Review:     Results for WINNIE CH (MRN [id number]) as of 11/3/2017 16:08   Ref. Range 4/25/2017 11:41 8/2/2017 12:23 8/2/2017 13:01   WBC Latest Ref Range: 3.8 - 10.8 Thousand/uL  6.8    RBC Latest Ref Range: 3.80 - 5.10 Million/uL  4.67    Hemoglobin Latest Ref Range: 11.7 - 15.5 g/dL  14.3    Hematocrit Latest Ref Range: 35.0 - 45.0 %  43.4    MCV Latest Ref Range: 80.0 - 100.0 fL  92.9    MCH Latest Ref Range: 27.0 - 33.0 pg  30.6    MCHC Latest Ref Range: 32.0 - 36.0 g/dL  32.9    RDW Latest Ref Range: 11.0 - 15.0 %  12.0    Platelets Latest Ref Range: 140 - 400 Thousand/uL  326    MPV Latest Ref Range: 7.5 - 12.5 fL  10.8    Lymph% Latest Units: %  29.9    Lymph # Latest Ref Range: 850 - 3,900 cells/uL  2,033    Mono% Latest Units: %  7.1    Mono # Latest Ref Range: 200 - 950 cells/uL  483    Eosinophil% Latest Units: %  0.9    Eos # Latest Ref Range: 15 - 500 cells/uL  61    Basophil% Latest Units: %  0.4    Baso # Latest Ref Range: 0 - 200 cells/uL  27    Neutrophils Relative Latest Units: %  61.7    Neutrophils Absolute Latest Ref Range: 1,500 - 7,800 cells/uL  4,196    Sodium Latest Ref Range: 135 - 146 mmol/L  139    Potassium Latest Ref Range: 3.5 - 5.3 mmol/L  4.5    Chloride Latest Ref Range: 98 - 110 mmol/L  104    CO2 Latest Ref Range: 20 - 31 mmol/L  30    BUN, Bld Latest Ref Range: 7 - 25 mg/dL  16    Creatinine Latest Ref Range: 0.50 - 1.10 mg/dL  0.79    BUN/Creatinine Ratio Latest Ref Range: 6 - 22 (calc)  NOT APPLICABLE    eGFR if non African American Latest Ref Range: > OR = 60 mL/min/1.73m2  93    eGFR if  Latest Ref Range: > OR = 60 mL/min/1.73m2  108    Glucose Latest Ref Range: 65 - 99 mg/dL  79    Calcium Latest Ref Range: 8.6 - 10.2 mg/dL  9.4    Alkaline Phosphatase Latest Ref Range: 33 - 115 U/L  61    Total Protein Latest Ref Range: 6.1 - 8.1 g/dL  7.1    Albumin Latest Ref  Range: 3.6 - 5.1 g/dL  4.2    Albumin/Globulin Ratio Latest Ref Range: 1.0 - 2.5 (calc)  1.4    Total Bilirubin Latest Ref Range: 0.2 - 1.2 mg/dL  0.5    AST Latest Ref Range: 10 - 30 U/L  17    ALT Latest Ref Range: 6 - 29 U/L  15    Triglycerides Latest Ref Range: <150 mg/dL 177 (H) 96    Globulin, Total Latest Ref Range: 1.9 - 3.7 g/dL (calc)  2.9    Cholesterol Latest Ref Range: 125 - 200 mg/dL 193 180    HDL Latest Ref Range: > OR = 46 mg/dL 51 55    LDL Cholesterol Latest Ref Range: <130 mg/dL (calc) 106.6 106    Total Cholesterol/HDL Ratio Latest Ref Range: 2.0 - 5.0  3.8     Non HDL Chol. (LDL+VLDL) Latest Units: mg/dL (calc)  125    Vit D, 25-Hydroxy Latest Ref Range: 30 - 96 ng/mL 19 (L)     Androstenedione Latest Ref Range: see text ng/dL 109     A1c Latest Ref Range: <5.7 % of total Hgb  5.1    TSH Latest Units: mIU/L  1.80    T3, Free Latest Ref Range: 2.3 - 4.2 pg/mL  3.4    T4, Free Latest Ref Range: 0.8 - 1.8 ng/dL  1.0    Chromogranin A Latest Ref Range: < OR = 15 ng/mL 5     DHEA Latest Ref Range: 0.630 - 4.700 ng/mL 2.330     DHEA-SO4 Latest Ref Range: 74.8 - 410.2 ug/dL 174.3     Estradiol Latest Ref Range: See Text pg/mL 102     Estrogen Latest Units: pg/mL 331     FSH Latest Ref Range: See Text mIU/mL 3.10     LH Latest Ref Range: See Text mIU/mL 1.2     Progesterone Latest Units: ng/mL 9.1 5.9    Testosterone Latest Ref Range: 2 - 45 ng/dL 26     Testosterone, Free Latest Ref Range: 0.2 - 5.0 pg/mL 5.4 (H)     Testosterone Testosterone Latest Ref Range: 0.5 - 8.5 ng/dL 10.3 (H)     Sex Hormone Binding Globulin Latest Ref Range: 17 - 124 nmol/L 15 (L)     Albumin Latest Ref Range: 3.6 - 5.1 g/dL 4.2     Glucose, UA Unknown   negative   Ketones, UA Unknown   negative   RBC, UA Unknown   trace-intact   WBC, UA Unknown   moderate   Bilirubin Unknown   negative   Protein Unknown   negative   Nitrite, UA Unknown   positive   Urobilinogen, UA Unknown   0.2   Spec Grav UA Unknown   1.020   pH, UA  Unknown   7.0   HDL/Chol Ratio Latest Ref Range: < OR = 5.0 (calc) 26.4 3.3    Non-HDL Cholesterol Latest Units: mg/dL 142     Vitamin D, 1,25 (OH)2 Latest Ref Range: 18 - 72 pg/mL  51    Vitamin D2, 1,25 (OH)2 Latest Units: pg/mL  <8    Vitamin D3, 1,25 (OH)2 Latest Units: pg/mL  51        Assessment:     1. Ovarian tumor  Estradiol    Estrogens, fractionated    Estrogens, total    Estriol, unconjugated, serum    Estrone    Follicle stimulating hormone    Progesterone    Prolactin    Luteinizing hormone   2. S/P left oophorectomy  Estradiol    Estrogens, fractionated    Estrogens, total    Estriol, unconjugated, serum    Estrone    Follicle stimulating hormone    Progesterone    Prolactin    Luteinizing hormone   3. Hirsutism  Testosterone Panel    Dihydrotestosterone    DHEA-sulfate    DHEA   4. Female hypertestosteronemia     5. Virilization     6. Vitamin D deficiency  Vitamin D    PTH, intact    Calcium, ionized   7. Menstrual irregularity          Regarding virilizing syndrome;S/p unilateral oophorectomy with Dr Jake ambrosio @ [facility]. It is still very early post surgery to see cosmetic effects though she has noticed some subtle changes already. To obtain basic reproductive labs as detailed above.  Regarding ovarian function; Since she has started having periods advised to check for ovulation using OTC urine ovulation kits for the next 3 months. If she is consistently ovulating will allow ~ 6mths to try and achieve spontaneous cyesis while having sexual exposure during her fertile period. If she is not consistently ovulating though will consider a trial of clomiphene to stimulate ovulation.   Regarding hirsutism; to continue cosmetic management of same for now.  Regarding hypovitaminosis d; to continue vitamin D repletion as before  Regarding dysmetabolic syndrome; Screening labs were essentially stable and thus we have not needed to commence active therapy for this at the moment.    Plan:     FFup in ~  4mths

## 2017-11-06 LAB
DHEA SERPL-MCNC: 1.49 NG/ML (ref 0.63–4.7)
ESTROGEN SERPL-MCNC: 322 PG/ML
U ESTRIOL SERPL-MCNC: <0.07 NG/ML

## 2017-11-08 LAB
ALBUMIN SERPL-MCNC: 3.9 G/DL (ref 3.6–5.1)
ESTRADIOL SERPL-MCNC: 121 PG/ML
ESTRONE SERPL-MCNC: 90 PG/ML
SHBG SERPL-SCNC: 16 NMOL/L (ref 17–124)
TESTOST FREE SERPL-MCNC: 4.3 PG/ML (ref 0.2–5)
TESTOST SERPL-MCNC: 21 NG/DL (ref 2–45)
TESTOSTERONE.FREE+WB SERPL-MCNC: 7.7 NG/DL (ref 0.5–8.5)

## 2017-11-09 LAB — ANDROSTANOLONE SERPL-MCNC: <50 PG/ML

## 2017-11-13 LAB — ESTRONE SERPL-MCNC: 52 PG/ML

## 2017-11-15 ENCOUNTER — HOSPITAL ENCOUNTER (OUTPATIENT)
Dept: RADIOLOGY | Facility: HOSPITAL | Age: 42
Discharge: HOME | End: 2017-11-15
Attending: NURSE PRACTITIONER
Payer: COMMERCIAL

## 2017-11-15 VITALS — BODY MASS INDEX: 31.65 KG/M2 | HEIGHT: 62 IN | WEIGHT: 172 LBS

## 2017-11-15 DIAGNOSIS — Z12.39 SCREENING FOR BREAST CANCER: ICD-10-CM

## 2017-11-15 DIAGNOSIS — N94.10 DYSPAREUNIA, FEMALE: ICD-10-CM

## 2017-11-15 PROCEDURE — 77067 SCR MAMMO BI INCL CAD: CPT | Mod: TC

## 2017-11-15 PROCEDURE — 76856 US EXAM PELVIC COMPLETE: CPT | Mod: TC

## 2017-11-15 PROCEDURE — 76830 TRANSVAGINAL US NON-OB: CPT | Mod: 26,,, | Performed by: RADIOLOGY

## 2017-11-15 PROCEDURE — 76856 US EXAM PELVIC COMPLETE: CPT | Mod: 26,,, | Performed by: RADIOLOGY

## 2018-05-14 ENCOUNTER — OFFICE VISIT (OUTPATIENT)
Dept: ENDOCRINOLOGY | Facility: CLINIC | Age: 43
End: 2018-05-14
Payer: COMMERCIAL

## 2018-05-14 ENCOUNTER — LAB VISIT (OUTPATIENT)
Dept: LAB | Facility: HOSPITAL | Age: 43
End: 2018-05-14
Attending: INTERNAL MEDICINE
Payer: COMMERCIAL

## 2018-05-14 VITALS
HEART RATE: 86 BPM | DIASTOLIC BLOOD PRESSURE: 76 MMHG | WEIGHT: 173.75 LBS | SYSTOLIC BLOOD PRESSURE: 116 MMHG | HEIGHT: 62 IN | BODY MASS INDEX: 31.97 KG/M2

## 2018-05-14 DIAGNOSIS — L68.0 HIRSUTISM: Chronic | ICD-10-CM

## 2018-05-14 DIAGNOSIS — Z90.721 S/P LEFT OOPHORECTOMY: ICD-10-CM

## 2018-05-14 DIAGNOSIS — N97.0 ANOVULATION: ICD-10-CM

## 2018-05-14 DIAGNOSIS — E25.9 VIRILIZATION: Primary | ICD-10-CM

## 2018-05-14 DIAGNOSIS — E55.9 VITAMIN D DEFICIENCY: Chronic | ICD-10-CM

## 2018-05-14 DIAGNOSIS — E25.9 VIRILIZATION: ICD-10-CM

## 2018-05-14 DIAGNOSIS — N92.6 MENSTRUAL IRREGULARITY: ICD-10-CM

## 2018-05-14 DIAGNOSIS — E88.810 DYSMETABOLIC SYNDROME: ICD-10-CM

## 2018-05-14 DIAGNOSIS — N83.8 OVARIAN MASS, LEFT: ICD-10-CM

## 2018-05-14 DIAGNOSIS — R03.0 PREHYPERTENSION: Chronic | ICD-10-CM

## 2018-05-14 LAB
ALBUMIN SERPL BCP-MCNC: 3.7 G/DL
ALP SERPL-CCNC: 60 U/L
ALT SERPL W/O P-5'-P-CCNC: 17 U/L
ANION GAP SERPL CALC-SCNC: 8 MMOL/L
AST SERPL-CCNC: 22 U/L
BILIRUB SERPL-MCNC: 0.7 MG/DL
BUN SERPL-MCNC: 17 MG/DL
CA-I BLDV-SCNC: 1.22 MMOL/L
CALCIUM SERPL-MCNC: 9.3 MG/DL
CHLORIDE SERPL-SCNC: 105 MMOL/L
CHOLEST SERPL-MCNC: 186 MG/DL
CHOLEST/HDLC SERPL: 3.4 {RATIO}
CO2 SERPL-SCNC: 26 MMOL/L
CREAT SERPL-MCNC: 0.9 MG/DL
EST. GFR  (AFRICAN AMERICAN): >60 ML/MIN/1.73 M^2
EST. GFR  (NON AFRICAN AMERICAN): >60 ML/MIN/1.73 M^2
ESTRADIOL SERPL-MCNC: 50 PG/ML
FSH SERPL-ACNC: 4.7 MIU/ML
GLUCOSE SERPL-MCNC: 77 MG/DL
HDLC SERPL-MCNC: 54 MG/DL
HDLC SERPL: 29 %
LDLC SERPL CALC-MCNC: 114.6 MG/DL
LH SERPL-ACNC: 3.7 MIU/ML
NONHDLC SERPL-MCNC: 132 MG/DL
POTASSIUM SERPL-SCNC: 3.8 MMOL/L
PROGEST SERPL-MCNC: 3.3 NG/ML
PROLACTIN SERPL IA-MCNC: 11.9 NG/ML
PROT SERPL-MCNC: 7.3 G/DL
PTH-INTACT SERPL-MCNC: 51 PG/ML
SODIUM SERPL-SCNC: 139 MMOL/L
T3 SERPL-MCNC: 91 NG/DL
TRIGL SERPL-MCNC: 87 MG/DL
TSH SERPL DL<=0.005 MIU/L-ACNC: 1.15 UIU/ML
URATE SERPL-MCNC: 4.3 MG/DL

## 2018-05-14 PROCEDURE — 84144 ASSAY OF PROGESTERONE: CPT

## 2018-05-14 PROCEDURE — 80061 LIPID PANEL: CPT

## 2018-05-14 PROCEDURE — 82627 DEHYDROEPIANDROSTERONE: CPT

## 2018-05-14 PROCEDURE — 83002 ASSAY OF GONADOTROPIN (LH): CPT

## 2018-05-14 PROCEDURE — 82306 VITAMIN D 25 HYDROXY: CPT

## 2018-05-14 PROCEDURE — 82672 ASSAY OF ESTROGEN: CPT

## 2018-05-14 PROCEDURE — 80053 COMPREHEN METABOLIC PANEL: CPT

## 2018-05-14 PROCEDURE — 83525 ASSAY OF INSULIN: CPT

## 2018-05-14 PROCEDURE — 82626 DEHYDROEPIANDROSTERONE: CPT

## 2018-05-14 PROCEDURE — 84480 ASSAY TRIIODOTHYRONINE (T3): CPT

## 2018-05-14 PROCEDURE — 84443 ASSAY THYROID STIM HORMONE: CPT

## 2018-05-14 PROCEDURE — 83970 ASSAY OF PARATHORMONE: CPT

## 2018-05-14 PROCEDURE — 84146 ASSAY OF PROLACTIN: CPT

## 2018-05-14 PROCEDURE — 3008F BODY MASS INDEX DOCD: CPT | Mod: CPTII,S$GLB,, | Performed by: INTERNAL MEDICINE

## 2018-05-14 PROCEDURE — 99999 PR PBB SHADOW E&M-EST. PATIENT-LVL III: CPT | Mod: PBBFAC,,, | Performed by: INTERNAL MEDICINE

## 2018-05-14 PROCEDURE — 82330 ASSAY OF CALCIUM: CPT

## 2018-05-14 PROCEDURE — 36415 COLL VENOUS BLD VENIPUNCTURE: CPT | Mod: PO

## 2018-05-14 PROCEDURE — 82040 ASSAY OF SERUM ALBUMIN: CPT

## 2018-05-14 PROCEDURE — 83036 HEMOGLOBIN GLYCOSYLATED A1C: CPT

## 2018-05-14 PROCEDURE — 84550 ASSAY OF BLOOD/URIC ACID: CPT

## 2018-05-14 PROCEDURE — 82670 ASSAY OF TOTAL ESTRADIOL: CPT

## 2018-05-14 PROCEDURE — 99214 OFFICE O/P EST MOD 30 MIN: CPT | Mod: S$GLB,,, | Performed by: INTERNAL MEDICINE

## 2018-05-14 PROCEDURE — 83001 ASSAY OF GONADOTROPIN (FSH): CPT

## 2018-05-14 RX ORDER — CLOMIPHENE CITRATE 50 MG/1
50 TABLET ORAL DAILY
Qty: 50 TABLET | Refills: 3 | Status: SHIPPED | OUTPATIENT
Start: 2018-05-14 | End: 2018-05-24

## 2018-05-14 NOTE — PROGRESS NOTES
Subjective:      Patient ID: Josseline Harper is a 43 y.o. female.    Chief Complaint:  No chief complaint on file.    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  The extensive work up she had had done thus far suggest that this is due to a Left ovarian lesion visualized by PET scan (Details in media tab section) but not by other imaging modalities including pelvic USS, abdominopelvic CT or octreotide scan. She in addition has hypovitaminosis D, obesity, dysmetabolic syndrome. The marked elevation in testosterone levels were essentially unchanged both with high dose dexamethasone suppression over 4 days and with 5 day course of Prempro.         Patient has noticed excess facial hair in the last 2-3 yrs and has escalated in the last year. It was slowly getting worse.  She had not noticed the excess hair growth anywhere else.   Patients facial hair grwoth is much less and her scalp hair grwoth is improving.    The work up thus far which has included multiple pelvic USS, octreo scan, adrenal CT and extensive lab hormonal tests and karyoptype strongly suggest that the findings are the result of an ovarian based cause of testosterone excess but this is unlikely to be PCOS. The major possibilities are a small functional ovarian tumor or ovarian hyperthecosis. PET scan revealed lesion in left ovary based on which she was referred to GYN for elective left oophorectomy.        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  Since the surgery her scalp hair has started to regrow and she is having to shave far less often than before.  Patient has started having periods since may this year. She is ffed by her GYN for gynecologic needs.  Patient has been having regular  "menses monthly but these are largely anovulatory based on early morning urine testing.  K; 2-3/28-32 no menorrhagia nor dysmenorrhea.      Review of Systems   Constitutional: Negative for fatigue and unexpected weight change.   HENT: Negative for trouble swallowing and voice change.    Eyes: Negative for visual disturbance.   Respiratory: Negative for cough and shortness of breath.    Cardiovascular: Negative for chest pain and palpitations.   Gastrointestinal: Negative for abdominal distention, nausea and vomiting.   Endocrine: Negative for polyuria.   Genitourinary: Negative for dysuria and menstrual problem.   Musculoskeletal: Negative for joint swelling.   Skin: Negative for color change, pallor and rash.        Scalp hair growth is much better now. No longer has androgenic alopecia. Also has less facial hirsutism.   Neurological: Negative for dizziness and headaches.   Hematological: Does not bruise/bleed easily.   Psychiatric/Behavioral: Negative for sleep disturbance.       Objective: /76 (BP Location: Left arm, Patient Position: Sitting, BP Method: Medium (Automatic))   Pulse 86   Ht 5' 2" (1.575 m)   Wt 78.8 kg (173 lb 11.6 oz)   BMI 31.77 kg/m²  Body surface area is 1.86 meters squared.         Physical Exam   Constitutional: She is oriented to person, place, and time. She appears well-developed and well-nourished. No distress.   Pleasant young lady. Looks older than chronologic age. Significantly less virilized and mildly hirsuite (in chin and moustache areas) and  her voice continues to remain quite feminine.   Not in any acute distress.   HENT:   Head: Normocephalic and atraumatic.   Eyes: Conjunctivae and EOM are normal. Pupils are equal, round, and reactive to light. No scleral icterus.   Neck: Normal range of motion. Neck supple. No JVD present.   Cardiovascular: Normal rate, regular rhythm and normal heart sounds.    Pulmonary/Chest: Effort normal and breath sounds normal. No respiratory " distress. She has no wheezes.   Abdominal: Soft. There is no tenderness.   Mild anterior abdominal wall obesity.   Musculoskeletal: Normal range of motion. She exhibits no tenderness.   Neurological: She is alert and oriented to person, place, and time. No cranial nerve deficit.   Skin: Skin is warm and dry. No rash noted. She is not diaphoretic. No erythema. No pallor.   Mild hypertrichosis as previously noted and mainly involving extremeties.   Psychiatric: She has a normal mood and affect. Her behavior is normal. Judgment and thought content normal.   Vitals reviewed.      Lab Review:     Results for WINNIE CH (MRN [id number]) as of 5/14/2018 14:36   Ref. Range 11/3/2017 17:19 11/3/2017 17:19   Calcium, Ion Latest Ref Range: 1.06 - 1.42 mmol/L 1.24    Vit D, 25-Hydroxy Latest Ref Range: 30 - 96 ng/mL 25 (L)    PTH Latest Ref Range: 9.0 - 77.0 pg/mL 34.0    DHEA Latest Ref Range: 0.630 - 4.700 ng/mL 1.490    DHEA-SO4 Latest Ref Range: 74.8 - 410.2 ug/dL 140.7    Dihydrotestosterone Latest Ref Range: <=300 pg/mL <50    Estradiol Latest Ref Range: See Text pg/mL 121 96   Estrone Latest Units: pg/mL 90    Estrogen Latest Units: pg/mL 322    Estrone pg/mL (Esoterix) Latest Ref Range: see text pg/mL 52    FSH Latest Ref Range: See Text mIU/mL 2.80    LH Latest Ref Range: See Text mIU/mL 3.7    Progesterone Latest Ref Range: See Text ng/mL 4.8    Prolactin Latest Ref Range: 5.2 - 26.5 ng/mL 9.7    Testosterone Latest Ref Range: 2 - 45 ng/dL 21    Testosterone, Free Latest Ref Range: 0.2 - 5.0 pg/mL 4.3    Testosterone Testosterone Latest Ref Range: 0.5 - 8.5 ng/dL 7.7    Sex Hormone Binding Globulin Latest Ref Range: 17 - 124 nmol/L 16 (L)    Albumin Latest Ref Range: 3.6 - 5.1 g/dL 3.9    Estriol, Unconjugated, Serum Latest Ref Range: <0.08 ng/mL <0.07        Assessment:     1. Virilization  Testosterone Panel    Estradiol    DHEA-sulfate    DHEA    Estrogens, total    Prolactin    Progesterone    Follicle  stimulating hormone    Luteinizing hormone   2. Ovarian mass, left  Testosterone Panel    Estradiol    DHEA-sulfate    DHEA    Estrogens, total    Prolactin    Progesterone    Follicle stimulating hormone    Luteinizing hormone   3. S/P left oophorectomy     4. Hirsutism  Testosterone Panel    Estradiol    DHEA-sulfate    DHEA    Estrogens, total    Prolactin    Progesterone    Follicle stimulating hormone    Luteinizing hormone   5. Prehypertension  Comprehensive metabolic panel    Uric acid    Lipid panel    Insulin, random    Microalbumin/creatinine urine ratio   6. Menstrual irregularity     7. Vitamin D deficiency  PTH, intact    Vitamin D    Comprehensive metabolic panel    Calcium, ionized   8. BMI 30.0-30.9,adult  Urinalysis    Lipid panel    Insulin, random   9. Dysmetabolic syndrome  Hemoglobin A1c    Uric acid    Urinalysis    Lipid panel    Insulin, random    TSH    T3   10. Anovulation  clomiPHENE (CLOMID) 50 mg tablet        Regarding virilizing syndrome; S/p unilateral oophorectomy with Dr Jake ambrosio @ [facility]. It is still very early post surgery to see cosmetic effects though she has noticed significant changes already regarding duration and intensity of terminal facial hair as well as degree of scalp hair grwoth. To obtain basic reproductive labs as detailed above.  Regarding ovarian function; To commence PO clomiphene 50mg QD for first 5 days of cycle for ovulation induction. Will try to dos this for next 6 months and to do her early morning ovulation testing from days 10-17 and once she ovulates to then have unprotected sex during the ffing 48 hrs to optimize her chances of achieving a pregnancy.   Regarding hirsutism; to continue cosmetic management of same for now.  Regarding hypovitaminosis d; to continue vitamin D repletion as before  Regarding dysmetabolic syndrome; Screening labs were essentially stable and thus we have not needed to commence active therapy for this at the  moment.    Plan:       FFup in ~ 3mths

## 2018-05-15 LAB
25(OH)D3+25(OH)D2 SERPL-MCNC: 23 NG/ML
DHEA-S SERPL-MCNC: 185.8 UG/DL
ESTIMATED AVG GLUCOSE: 97 MG/DL
HBA1C MFR BLD HPLC: 5 %
INSULIN COLLECTION INTERVAL: 0
INSULIN SERPL-ACNC: 11.5 UU/ML

## 2018-05-16 LAB — ESTROGEN SERPL-MCNC: 228 PG/ML

## 2018-05-18 LAB
ALBUMIN SERPL-MCNC: 4.1 G/DL (ref 3.6–5.1)
DHEA SERPL-MCNC: 1.06 NG/ML (ref 0.63–4.7)
SHBG SERPL-SCNC: 18 NMOL/L (ref 17–124)
TESTOST FREE SERPL-MCNC: 2.1 PG/ML (ref 0.2–5)
TESTOST SERPL-MCNC: 11 NG/DL (ref 2–45)
TESTOSTERONE.FREE+WB SERPL-MCNC: 4 NG/DL (ref 0.5–8.5)

## 2018-06-04 ENCOUNTER — PATIENT MESSAGE (OUTPATIENT)
Dept: GYNECOLOGIC ONCOLOGY | Facility: CLINIC | Age: 43
End: 2018-06-04

## 2018-06-13 ENCOUNTER — PATIENT MESSAGE (OUTPATIENT)
Dept: GYNECOLOGIC ONCOLOGY | Facility: CLINIC | Age: 43
End: 2018-06-13

## 2018-06-27 ENCOUNTER — OFFICE VISIT (OUTPATIENT)
Dept: GYNECOLOGIC ONCOLOGY | Facility: CLINIC | Age: 43
End: 2018-06-27
Payer: COMMERCIAL

## 2018-06-27 VITALS
SYSTOLIC BLOOD PRESSURE: 124 MMHG | DIASTOLIC BLOOD PRESSURE: 67 MMHG | BODY MASS INDEX: 32.01 KG/M2 | HEIGHT: 62 IN | HEART RATE: 91 BPM | WEIGHT: 173.94 LBS

## 2018-06-27 DIAGNOSIS — B96.89 BV (BACTERIAL VAGINOSIS): ICD-10-CM

## 2018-06-27 DIAGNOSIS — N83.201 RIGHT OVARIAN CYST: ICD-10-CM

## 2018-06-27 DIAGNOSIS — N76.0 BV (BACTERIAL VAGINOSIS): ICD-10-CM

## 2018-06-27 DIAGNOSIS — D49.59 OVARIAN TUMOR: ICD-10-CM

## 2018-06-27 PROBLEM — N83.8 OVARIAN MASS, LEFT: Status: RESOLVED | Noted: 2017-01-04 | Resolved: 2018-06-27

## 2018-06-27 PROCEDURE — 99214 OFFICE O/P EST MOD 30 MIN: CPT | Mod: S$GLB,,, | Performed by: OBSTETRICS & GYNECOLOGY

## 2018-06-27 PROCEDURE — 3008F BODY MASS INDEX DOCD: CPT | Mod: CPTII,S$GLB,, | Performed by: OBSTETRICS & GYNECOLOGY

## 2018-06-27 PROCEDURE — 99999 PR PBB SHADOW E&M-EST. PATIENT-LVL III: CPT | Mod: PBBFAC,,, | Performed by: OBSTETRICS & GYNECOLOGY

## 2018-06-27 RX ORDER — CLINDAMYCIN PHOSPHATE 20 MG/G
CREAM VAGINAL NIGHTLY
Qty: 40 G | Refills: 0 | Status: SHIPPED | OUTPATIENT
Start: 2018-06-27 | End: 2019-02-13

## 2018-06-27 NOTE — PROGRESS NOTES
"Subjective:       Patient ID: Josseline Harper is a 43 y.o. female.    Chief Complaint: Follow-up    HPI     Patient comes in today with complaint of painful intercourse. Had US done at [facility] and this shows a 2.5 cm cyst in right ovary cw follicle.     Having pain with deep penetration. 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   For patient her testosterone level has returned to normal.  Additionally her facial hair is less prominent.  She is regaining some hair growth on her head.    She is anovulatory according to the endocrinologist who is caring for her.  However, she is having regular monthly cycles.      Pap: 10/23/2017:: normal.   Mammogram: Nov 2017: normal.       Treatment history:  Saw Dr. Berny Campbell, Endocrinologist,  because of increasing facial hair. Thought to be due initially to PCO but testosterone levels did not suppress with Prempro or dexamethasone. Work up to date has included a CT scan that showed normal ovaries. Octreotide study was negative.  Pelvic US showed bilateral ovarian cysts consistent with PCO.     Then had PET scan done at [facility] in Sept 2016 that shows a hypermetabolic area in the left ovary with SUV of 5.1.      March 2017: robotic assisted LSO. Final pathology consistent with Leydig cell tumor.      Review of Systems   Genitourinary: Positive for dyspareunia.       Objective:   /67   Pulse 91   Ht 5' 2" (1.575 m)   Wt 78.9 kg (173 lb 15.1 oz)   BMI 31.81 kg/m²      Physical Exam   Constitutional: She is oriented to person, place, and time. She appears well-developed and well-nourished.   HENT:   Head: Normocephalic and atraumatic.   Eyes: No scleral icterus.   Neck: Neck supple. No tracheal deviation present. No thyroid mass and no thyromegaly present.   Pulmonary/Chest: She has no wheezes.   Abdominal: Soft. She exhibits no distension and no mass. There is no hepatosplenomegaly. There is no " tenderness. There is no rebound and no guarding.   Genitourinary:   Genitourinary Comments: Bimanual exam:  Vulva: no lesions. Normal appearance  Urethra: Normal size and location. No lesions  Bladder: No masses or tenderness.  Vagina: normal mucosa. No lesion. Slight discharge with slight odor.   Cervix: normal   Uterus: not enlarged.   Adnexa: no masses.  Rectovaginal: Not performed     Lymphadenopathy:     She has no cervical adenopathy.     She has no axillary adenopathy.        Right: No inguinal and no supraclavicular adenopathy present.        Left: No inguinal and no supraclavicular adenopathy present.   Neurological: She is alert and oriented to person, place, and time.   Skin: Skin is warm and dry.   Psychiatric: She has a normal mood and affect. Her behavior is normal. Judgment and thought content normal.       Assessment:       1. Right ovarian cyst    2. BV (bacterial vaginosis)        Plan:   Right ovarian cyst  Follicular cyst.   Will Repeat US in 6 weeks    -     US Pelvis Comp with Transvag NON-OB (xpd; Future; Expected date: 06/27/2018    BV (bacterial vaginosis)  -     clindamycin (CLINDESSE) 2 % vaginal cream; Place vaginally every evening.  Dispense: 40 g; Refill: 0

## 2018-08-08 ENCOUNTER — HOSPITAL ENCOUNTER (OUTPATIENT)
Dept: RADIOLOGY | Facility: HOSPITAL | Age: 43
Discharge: HOME | End: 2018-08-08
Attending: OBSTETRICS & GYNECOLOGY
Payer: COMMERCIAL

## 2018-08-08 DIAGNOSIS — N83.201 RIGHT OVARIAN CYST: ICD-10-CM

## 2018-08-08 PROCEDURE — 76856 US EXAM PELVIC COMPLETE: CPT | Mod: 26,,, | Performed by: RADIOLOGY

## 2018-08-08 PROCEDURE — 76856 US EXAM PELVIC COMPLETE: CPT | Mod: TC

## 2018-08-08 PROCEDURE — 76830 TRANSVAGINAL US NON-OB: CPT | Mod: 26,,, | Performed by: RADIOLOGY

## 2018-10-09 ENCOUNTER — OFFICE VISIT (OUTPATIENT)
Dept: ENDOCRINOLOGY | Facility: CLINIC | Age: 43
End: 2018-10-09
Payer: COMMERCIAL

## 2018-10-09 ENCOUNTER — LAB VISIT (OUTPATIENT)
Dept: LAB | Facility: HOSPITAL | Age: 43
End: 2018-10-09
Attending: INTERNAL MEDICINE
Payer: COMMERCIAL

## 2018-10-09 VITALS
SYSTOLIC BLOOD PRESSURE: 101 MMHG | RESPIRATION RATE: 18 BRPM | TEMPERATURE: 99 F | DIASTOLIC BLOOD PRESSURE: 67 MMHG | BODY MASS INDEX: 31.68 KG/M2 | WEIGHT: 172.19 LBS | HEART RATE: 66 BPM | HEIGHT: 62 IN

## 2018-10-09 DIAGNOSIS — E66.9 OBESITY (BMI 30-39.9): ICD-10-CM

## 2018-10-09 DIAGNOSIS — N97.0 ANOVULATION: ICD-10-CM

## 2018-10-09 DIAGNOSIS — E25.9 VIRILIZATION: ICD-10-CM

## 2018-10-09 DIAGNOSIS — E88.810 DYSMETABOLIC SYNDROME: ICD-10-CM

## 2018-10-09 DIAGNOSIS — D49.59 OVARIAN TUMOR: ICD-10-CM

## 2018-10-09 DIAGNOSIS — L68.0 HIRSUTISM: Chronic | ICD-10-CM

## 2018-10-09 DIAGNOSIS — E55.9 VITAMIN D DEFICIENCY: Chronic | ICD-10-CM

## 2018-10-09 DIAGNOSIS — Z98.890 S/P ROBOT-ASSISTED SURGICAL PROCEDURE: ICD-10-CM

## 2018-10-09 DIAGNOSIS — N92.6 MENSTRUAL IRREGULARITY: ICD-10-CM

## 2018-10-09 DIAGNOSIS — N92.6 MENSTRUAL IRREGULARITY: Primary | ICD-10-CM

## 2018-10-09 DIAGNOSIS — E34.9 FEMALE HYPERTESTOSTERONEMIA: Chronic | ICD-10-CM

## 2018-10-09 LAB
25(OH)D3+25(OH)D2 SERPL-MCNC: 24 NG/ML
CA-I BLDV-SCNC: 1.18 MMOL/L
DHEA-S SERPL-MCNC: 223.9 UG/DL
ESTRADIOL SERPL-MCNC: 443 PG/ML
FSH SERPL-ACNC: 8 MIU/ML
LH SERPL-ACNC: 6.2 MIU/ML
PROLACTIN SERPL IA-MCNC: 6.7 NG/ML
PTH-INTACT SERPL-MCNC: 55 PG/ML
T4 FREE SERPL-MCNC: 1.06 NG/DL
TSH SERPL DL<=0.005 MIU/L-ACNC: 0.99 UIU/ML

## 2018-10-09 PROCEDURE — 82670 ASSAY OF TOTAL ESTRADIOL: CPT

## 2018-10-09 PROCEDURE — 82040 ASSAY OF SERUM ALBUMIN: CPT

## 2018-10-09 PROCEDURE — 84270 ASSAY OF SEX HORMONE GLOBUL: CPT

## 2018-10-09 PROCEDURE — 3008F BODY MASS INDEX DOCD: CPT | Mod: CPTII,S$GLB,, | Performed by: INTERNAL MEDICINE

## 2018-10-09 PROCEDURE — 80327 ANABOLIC STEROID 1 OR 2: CPT

## 2018-10-09 PROCEDURE — 82672 ASSAY OF ESTROGEN: CPT

## 2018-10-09 PROCEDURE — 99214 OFFICE O/P EST MOD 30 MIN: CPT | Mod: S$GLB,,, | Performed by: INTERNAL MEDICINE

## 2018-10-09 PROCEDURE — 84146 ASSAY OF PROLACTIN: CPT

## 2018-10-09 PROCEDURE — 83002 ASSAY OF GONADOTROPIN (LH): CPT

## 2018-10-09 PROCEDURE — 82679 ASSAY OF ESTRONE: CPT

## 2018-10-09 PROCEDURE — 82626 DEHYDROEPIANDROSTERONE: CPT

## 2018-10-09 PROCEDURE — 82330 ASSAY OF CALCIUM: CPT

## 2018-10-09 PROCEDURE — 83970 ASSAY OF PARATHORMONE: CPT

## 2018-10-09 PROCEDURE — 82306 VITAMIN D 25 HYDROXY: CPT

## 2018-10-09 PROCEDURE — 82627 DEHYDROEPIANDROSTERONE: CPT

## 2018-10-09 PROCEDURE — 84443 ASSAY THYROID STIM HORMONE: CPT

## 2018-10-09 PROCEDURE — 36415 COLL VENOUS BLD VENIPUNCTURE: CPT | Mod: PO

## 2018-10-09 PROCEDURE — 83001 ASSAY OF GONADOTROPIN (FSH): CPT

## 2018-10-09 PROCEDURE — 99999 PR PBB SHADOW E&M-EST. PATIENT-LVL III: CPT | Mod: PBBFAC,,, | Performed by: INTERNAL MEDICINE

## 2018-10-09 PROCEDURE — 84439 ASSAY OF FREE THYROXINE: CPT

## 2018-10-09 RX ORDER — CLOMIPHENE CITRATE 50 MG/1
50 TABLET ORAL DAILY
Qty: 30 TABLET | Refills: 3 | Status: SHIPPED | OUTPATIENT
Start: 2018-10-09 | End: 2019-04-26 | Stop reason: SDUPTHER

## 2018-10-09 NOTE — PROGRESS NOTES
Subjective:      Patient ID: Josseline Harper is a 43 y.o. female.    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  The extensive work up she had had done revealed that this was due to a Left ovarian lesion visualized by PET scan (Details in media tab section) but not by other imaging modalities including pelvic USS, abdominopelvic CT or octreotide scan. She in addition has hypovitaminosis D, obesity, dysmetabolic syndrome. The marked elevation in testosterone levels were essentially unchanged both with high dose dexamethasone suppression over 4 days and with 5 day course of Prempro.          The work up included multiple pelvic USS, octreo scan, adrenal CT and extensive lab hormonal tests and karyoptype strongly suggest that the findings are the result of an ovarian based cause of testosterone excess but this is unlikely to be PCOS. The major possibilities are a small functional ovarian tumor or ovarian hyperthecosis. PET scan revealed lesion in left ovary based on which she was referred to GYN for elective left oophorectomy.     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  Since the surgery her scalp hair has started to regrow and she is having to shave far less often than before.  Patient started having periods since may 2018. She is ffed by her GYN for gynecologic needs.  Patient has been having regular menses monthly but these are largely anovulatory based on early morning urine testing.  K; 2-3/28-32 no menorrhagia nor dysmenorrhea.  Patient has taken two cycles of clomid thus far.        Review of Systems   Constitutional: Negative for fatigue and unexpected weight change.   HENT: Negative for trouble swallowing and  "voice change.    Eyes: Negative for visual disturbance.   Respiratory: Negative for cough and shortness of breath.    Cardiovascular: Negative for chest pain and palpitations.   Gastrointestinal: Negative for abdominal distention, nausea and vomiting.   Endocrine: Negative for polyuria.   Genitourinary: Negative for dysuria and menstrual problem.   Musculoskeletal: Negative for joint swelling.   Skin: Negative for color change, pallor and rash.        Scalp hair growth is much better now. No longer has androgenic alopecia. Also has less facial hirsutism.   Neurological: Negative for dizziness and headaches.   Hematological: Does not bruise/bleed easily.   Psychiatric/Behavioral: Negative for sleep disturbance.       Objective:   /67 (BP Location: Right arm, Patient Position: Sitting, BP Method: Large (Automatic))   Pulse 66   Temp 98.6 °F (37 °C) (Oral)   Resp 18   Ht 5' 2" (1.575 m)   Wt 78.1 kg (172 lb 2.9 oz)   BMI 31.49 kg/m²  Body surface area is 1.85 meters squared.   Neck circ; 13" waist circ; 38" hip circ; 42" waist to hip ratio; 0.9         Physical Exam   Constitutional: She is oriented to person, place, and time. She appears well-developed and well-nourished. No distress.   Pleasant young lady. Looks older than chronologic age. Significantly less virilized and mildly hirsuite (in chin and moustache areas) and  her voice continues to remain quite feminine.   Not in any acute distress.   HENT:   Head: Normocephalic and atraumatic.   Eyes: Conjunctivae and EOM are normal. Pupils are equal, round, and reactive to light. No scleral icterus.   Neck: Normal range of motion. Neck supple. No JVD present.   Cardiovascular: Normal rate, regular rhythm and normal heart sounds.   Pulmonary/Chest: Effort normal and breath sounds normal. No respiratory distress. She has no wheezes.   Abdominal: Soft. There is no tenderness.   Mild anterior abdominal wall obesity.   Musculoskeletal: Normal range of motion. " She exhibits no tenderness.   Neurological: She is alert and oriented to person, place, and time. No cranial nerve deficit.   Skin: Skin is warm and dry. No rash noted. She is not diaphoretic. No erythema. No pallor.   Prior noted hypertrichosis is essentially resolved.   Psychiatric: She has a normal mood and affect. Her behavior is normal. Judgment and thought content normal.   Vitals reviewed.      Lab Review:     Results for WINNIE CH (MRN [id number]) as of 10/9/2018 13:46   Ref. Range 5/14/2018 15:08 5/14/2018 15:27   Sodium Latest Ref Range: 136 - 145 mmol/L  139   Potassium Latest Ref Range: 3.5 - 5.1 mmol/L  3.8   Chloride Latest Ref Range: 95 - 110 mmol/L  105   CO2 Latest Ref Range: 23 - 29 mmol/L  26   Anion Gap Latest Ref Range: 8 - 16 mmol/L  8   BUN, Bld Latest Ref Range: 6 - 20 mg/dL  17   Creatinine Latest Ref Range: 0.5 - 1.4 mg/dL  0.9   eGFR if non African American Latest Ref Range: >60 mL/min/1.73 m^2  >60.0   eGFR if African American Latest Ref Range: >60 mL/min/1.73 m^2  >60.0   Glucose Latest Ref Range: 70 - 110 mg/dL  77   Calcium Latest Ref Range: 8.7 - 10.5 mg/dL  9.3   Calcium, Ion Latest Ref Range: 1.06 - 1.42 mmol/L  1.22   Alkaline Phosphatase Latest Ref Range: 55 - 135 U/L  60   Total Protein Latest Ref Range: 6.0 - 8.4 g/dL  7.3   Albumin Latest Ref Range: 3.5 - 5.2 g/dL  3.7   Uric Acid Latest Ref Range: 2.4 - 5.7 mg/dL  4.3   Total Bilirubin Latest Ref Range: 0.1 - 1.0 mg/dL  0.7   AST Latest Ref Range: 10 - 40 U/L  22   ALT Latest Ref Range: 10 - 44 U/L  17   Triglycerides Latest Ref Range: 30 - 150 mg/dL  87   Cholesterol Latest Ref Range: 120 - 199 mg/dL  186   HDL Latest Ref Range: 40 - 75 mg/dL  54   LDL Cholesterol Latest Ref Range: 63.0 - 159.0 mg/dL  114.6   Total Cholesterol/HDL Ratio Latest Ref Range: 2.0 - 5.0   3.4   Vit D, 25-Hydroxy Latest Ref Range: 30 - 96 ng/mL  23 (L)   Hemoglobin A1C Latest Ref Range: 4.0 - 5.6 %  5.0   Estimated Avg Glucose Latest Ref  Range: 68 - 131 mg/dL  97   Insulin Latest Ref Range: <25.0 uU/mL  11.5   TSH Latest Ref Range: 0.400 - 4.000 uIU/mL  1.151   T3, Total Latest Ref Range: 60 - 180 ng/dL  91   PTH Latest Ref Range: 9.0 - 77.0 pg/mL  51.0   DHEA Latest Ref Range: 0.630 - 4.700 ng/mL  1.060   DHEA-SO4 Latest Ref Range: 74.8 - 410.2 ug/dL  185.8   Estradiol Latest Ref Range: See Text pg/mL  50   Estrogen Latest Units: pg/mL  228   FSH Latest Ref Range: See Text mIU/mL  4.70   LH Latest Ref Range: See Text mIU/mL  3.7   Progesterone Latest Ref Range: See Text ng/mL  3.3   Prolactin Latest Ref Range: 5.2 - 26.5 ng/mL  11.9   Testosterone Latest Ref Range: 2 - 45 ng/dL  11   Testosterone, Free Latest Ref Range: 0.2 - 5.0 pg/mL  2.1   Testosterone Testosterone Latest Ref Range: 0.5 - 8.5 ng/dL  4.0   Sex Hormone Binding Globulin Latest Ref Range: 17 - 124 nmol/L  18   Albumin Latest Ref Range: 3.6 - 5.1 g/dL  4.1   Specimen UA Unknown Urine, Clean Catch    Color, UA Latest Ref Range: Yellow, Straw, Giovana  Giovana    pH, UA Latest Ref Range: 5.0 - 8.0  5.0    Specific Gravity, UA Latest Ref Range: 1.005 - 1.030  1.030    Appearance, UA Latest Ref Range: Clear  Cloudy (A)    Protein, UA Latest Ref Range: Negative  Negative    Glucose, UA Latest Ref Range: Negative  Negative    Ketones, UA Latest Ref Range: Negative  Negative    Occult Blood UA Latest Ref Range: Negative  Negative    Nitrite, UA Latest Ref Range: Negative  Positive (A)    Urobilinogen, UA Latest Ref Range: <2.0 EU/dL Negative    Bilirubin (UA) Latest Ref Range: Negative  Negative    Leukocytes, UA Latest Ref Range: Negative  Negative    RBC, UA Latest Ref Range: 0 - 4 /hpf 1    WBC, UA Latest Ref Range: 0 - 5 /hpf 4    Bacteria, UA Latest Ref Range: None-Occ /hpf Moderate (A)    Squam Epithel, UA Latest Units: /hpf 5    Microscopic Comment Unknown SEE COMMENT    Microalbum.,U,Random Latest Units: ug/mL 11.0    Microalb Creat Ratio Latest Ref Range: 0.0 - 30.0 ug/mg 4.7     Creatinine, Random Ur Latest Ref Range: 15.0 - 325.0 mg/dL 233.0    HDL/Chol Ratio Latest Ref Range: 20.0 - 50.0 %  29.0   Insulin Collection Interval Unknown  [id number]   Non-HDL Cholesterol Latest Units: mg/dL  132       Assessment:     1. Menstrual irregularity  Luteinizing hormone    Estradiol    Estrogens, total    Estrogens, fractionated    Follicle stimulating hormone    TSH    T4, free    Prolactin   2. Anovulation  Estradiol    Estrogens, total    Estrogens, fractionated    Follicle stimulating hormone   3. Ovarian tumor     4. Hirsutism  DHEA    DHEA-sulfate    Testosterone Panel    Dihydrotestosterone   5. Vitamin D deficiency  Vitamin D    Calcium, ionized    PTH, intact   6. Female hypertestosteronemia     7. Dysmetabolic syndrome     8. S/P robot-assisted Left Oophorectomy     9. Virilization     10. Obesity (BMI 30-39.9)          Regarding virilizing syndrome; S/p unilateral oophorectomy with Dr Jake ambrosio @ [facility]. Patient still has some hirsutism but scalp hair is much better than prior.  Regarding ovarian function; To continue PO clomiphene 50mg QD for first 5 days of cycle for ovulation induction for next 6 cycles. Will try to dos this for next 6 months and to do her early morning ovulation testing from days 10-17 and once she ovulates to then have unprotected sex during the ffing 48 hrs to optimize her chances of achieving a pregnancy.   Regarding hirsutism; to continue cosmetic management of same for now.  Regarding hypovitaminosis d; to continue vitamin D repletion as before  Regarding dysmetabolic syndrome; Screening labs were essentially stable and thus we have not needed to commence active therapy for this at the moment.        Plan:     FFup in 4mths.

## 2018-10-10 PROBLEM — R91.8 PULMONARY NODULES: Status: ACTIVE | Noted: 2018-10-10

## 2018-10-12 LAB — ESTROGEN SERPL-MCNC: NORMAL PG/ML

## 2018-10-13 LAB
ALBUMIN SERPL-MCNC: 4.1 G/DL (ref 3.6–5.1)
ANDROSTANOLONE SERPL-MCNC: <50 PG/ML
SHBG SERPL-SCNC: 18 NMOL/L (ref 17–124)
TESTOST FREE SERPL-MCNC: 6.3 PG/ML (ref 0.2–5)
TESTOST SERPL-MCNC: 34 NG/DL (ref 2–45)
TESTOSTERONE.FREE+WB SERPL-MCNC: 11.9 NG/DL (ref 0.5–8.5)

## 2018-10-15 LAB
ESTRADIOL SERPL-MCNC: 553 PG/ML
ESTRONE SERPL-MCNC: 375 PG/ML

## 2018-10-16 LAB — DHEA SERPL-MCNC: 2.97 NG/ML (ref 0.63–4.7)

## 2019-03-19 ENCOUNTER — PATIENT MESSAGE (OUTPATIENT)
Dept: ENDOCRINOLOGY | Facility: CLINIC | Age: 44
End: 2019-03-19

## 2019-04-26 RX ORDER — CLOMIPHENE CITRATE 50 MG/1
50 TABLET ORAL DAILY
Qty: 30 TABLET | Refills: 3 | Status: SHIPPED | OUTPATIENT
Start: 2019-04-26 | End: 2021-06-14

## 2022-04-20 ENCOUNTER — HOSPITAL ENCOUNTER (OUTPATIENT)
Dept: RADIOLOGY | Facility: HOSPITAL | Age: 47
Discharge: HOME | End: 2022-04-20
Attending: PHYSICAL MEDICINE & REHABILITATION
Payer: COMMERCIAL

## 2022-04-20 DIAGNOSIS — Z12.31 ENCOUNTER FOR SCREENING MAMMOGRAM FOR MALIGNANT NEOPLASM OF BREAST: ICD-10-CM

## 2022-04-20 PROCEDURE — 77063 BREAST TOMOSYNTHESIS BI: CPT | Mod: 26,,, | Performed by: RADIOLOGY

## 2022-04-20 PROCEDURE — 77067 SCR MAMMO BI INCL CAD: CPT | Mod: 26,,, | Performed by: RADIOLOGY

## 2022-04-20 PROCEDURE — 77067 MAMMO DIGITAL SCREENING BILAT WITH TOMO: ICD-10-PCS | Mod: 26,,, | Performed by: RADIOLOGY

## 2022-04-20 PROCEDURE — 77063 BREAST TOMOSYNTHESIS BI: CPT | Mod: TC

## 2022-04-20 PROCEDURE — 77063 MAMMO DIGITAL SCREENING BILAT WITH TOMO: ICD-10-PCS | Mod: 26,,, | Performed by: RADIOLOGY

## 2022-04-20 PROCEDURE — 77067 SCR MAMMO BI INCL CAD: CPT | Mod: TC

## 2024-02-01 ENCOUNTER — HOSPITAL ENCOUNTER (OUTPATIENT)
Dept: RADIOLOGY | Facility: CLINIC | Age: 49
Discharge: HOME | End: 2024-02-01
Attending: PHYSICAL MEDICINE & REHABILITATION
Payer: COMMERCIAL

## 2024-02-01 ENCOUNTER — HOSPITAL ENCOUNTER (OUTPATIENT)
Dept: RADIOLOGY | Facility: HOSPITAL | Age: 49
Discharge: HOME | End: 2024-02-01
Attending: PHYSICAL MEDICINE & REHABILITATION
Payer: COMMERCIAL

## 2024-02-01 DIAGNOSIS — Z12.31 ENCOUNTER FOR SCREENING MAMMOGRAM FOR MALIGNANT NEOPLASM OF BREAST: ICD-10-CM

## 2024-02-01 DIAGNOSIS — R91.1 PULMONARY NODULE: ICD-10-CM

## 2024-02-01 PROCEDURE — 71250 CT THORAX DX C-: CPT | Mod: 26,,, | Performed by: RADIOLOGY

## 2024-02-01 PROCEDURE — 77063 BREAST TOMOSYNTHESIS BI: CPT | Mod: 26,,, | Performed by: RADIOLOGY

## 2024-02-01 PROCEDURE — 77067 SCR MAMMO BI INCL CAD: CPT | Mod: 26,,, | Performed by: RADIOLOGY

## 2024-02-01 PROCEDURE — 71250 CT THORAX DX C-: CPT | Mod: TC

## 2024-02-01 PROCEDURE — 77067 SCR MAMMO BI INCL CAD: CPT | Mod: TC,PO

## 2024-07-24 ENCOUNTER — OFFICE VISIT (OUTPATIENT)
Dept: ENDOCRINOLOGY | Facility: CLINIC | Age: 49
End: 2024-07-24
Payer: COMMERCIAL

## 2024-07-24 VITALS
HEART RATE: 65 BPM | WEIGHT: 184.31 LBS | BODY MASS INDEX: 33.71 KG/M2 | DIASTOLIC BLOOD PRESSURE: 81 MMHG | SYSTOLIC BLOOD PRESSURE: 119 MMHG

## 2024-07-24 DIAGNOSIS — L68.0 HIRSUTISM: ICD-10-CM

## 2024-07-24 DIAGNOSIS — E25.9 VIRILIZATION: ICD-10-CM

## 2024-07-24 PROCEDURE — 3044F HG A1C LEVEL LT 7.0%: CPT | Mod: CPTII,S$GLB,, | Performed by: INTERNAL MEDICINE

## 2024-07-24 PROCEDURE — 3074F SYST BP LT 130 MM HG: CPT | Mod: CPTII,S$GLB,, | Performed by: INTERNAL MEDICINE

## 2024-07-24 PROCEDURE — 99204 OFFICE O/P NEW MOD 45 MIN: CPT | Mod: S$GLB,,, | Performed by: INTERNAL MEDICINE

## 2024-07-24 PROCEDURE — 1160F RVW MEDS BY RX/DR IN RCRD: CPT | Mod: CPTII,S$GLB,, | Performed by: INTERNAL MEDICINE

## 2024-07-24 PROCEDURE — 3079F DIAST BP 80-89 MM HG: CPT | Mod: CPTII,S$GLB,, | Performed by: INTERNAL MEDICINE

## 2024-07-24 PROCEDURE — 3008F BODY MASS INDEX DOCD: CPT | Mod: CPTII,S$GLB,, | Performed by: INTERNAL MEDICINE

## 2024-07-24 PROCEDURE — 99999 PR PBB SHADOW E&M-EST. PATIENT-LVL III: CPT | Mod: PBBFAC,,, | Performed by: INTERNAL MEDICINE

## 2024-07-24 PROCEDURE — 1159F MED LIST DOCD IN RCRD: CPT | Mod: CPTII,S$GLB,, | Performed by: INTERNAL MEDICINE

## 2024-07-24 RX ORDER — DEXAMETHASONE 1 MG/1
1 TABLET ORAL ONCE
Qty: 1 TABLET | Refills: 0 | Status: SHIPPED | OUTPATIENT
Start: 2024-07-24 | End: 2024-07-24

## 2024-07-24 NOTE — ASSESSMENT & PLAN NOTE
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  Testosterone levels are normal since her surgery.  Improvement in her hirsutism since the surgery.  She now shaves every few weeks.    Her prior hormone evaluation was normal.  Discussed doing a DST.  She does report concerns about hirsutism and is interested in starting spironolactone.  Discussed side effects of the medication and teratogenic effects.

## 2024-07-24 NOTE — PROGRESS NOTES
Subjective:      Patient ID: Josseline Sotomayor is referred by Magdalene Peter NP     Chief Complaint:  Hirsutism    History of Present Illness    Josseline Sotomayor is a 49 y.o. female who presents for evaluation of hirsutism.  Patient was previously seen by Dr. Campbell in 2018.    Patient had an initial endocrinology consult in 04/2016 for possible PCOS.  She had reported facial hirsutism for 2-3 years and male pattern baldness.  Patient had history of irregular menstrual cycles since her teens and was on Depo-Provera for about 10 years.    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  She had normal DHEA-S, 17 hydroxy progesterone and prolactin levels.    Patient reported improvement in her hirsutism.  Testosterone level has been normal since her surgery.  She was shaving daily prior to her surgery and now can go for a few weeks without shaving.  Also reports improvement in her scalp hair growth.    Denies prior spironolactone use.    Menstrual History/Obstetric:   Frequency of cycle: Irregular since her teens, since Sx has been regular.   Duration of menses: Light flow 1-2 days - more in the last few months.    Obstetric history: G0  Menarche: 14 years.     Acne: None  Scalp hair loss:  Improvement since the surgery      Androgen Labs:     Lab Results   Component Value Date    TESTOSTERONE 2.0 07/15/2024    TESTOSTERONE 12 07/15/2024    TESTOSTERONE 34 10/09/2018    TESTOSTERONE 6.3 (H) 10/09/2018    TESTOSTERONE 11 05/14/2018    TESTOSTERONE 2.1 05/14/2018     Lab Results   Component Value Date    DHEASO4 223.9 10/09/2018    DHEASO4 185.8 05/14/2018     Lab Results   Component Value Date    PROLACTIN 6.7 10/09/2018    17HYDROXYPRO 241 04/22/2016    LABLH 3.0 07/15/2024    FSH 8.00 10/09/2018    ESTROGENLEVE 1,054 10/09/2018    ESTRADIOL 443 10/09/2018    CORTISOL 6.9 04/22/2016    ACTH 15 04/22/2016     Lab Results   Component Value  Date    TSH 2.36 06/12/2024       Weight:  Fairly stable, fluctuates a few lbs.  BMI today of 33.  Dietary interventions:  Denies  Physical activity:  Denies    Lipid panel  Lab Results   Component Value Date    CHOL 200 (H) 06/12/2024    TRIG 79 06/12/2024    HDL 64 06/12/2024    LDLCALC 118 (H) 06/12/2024    CHOLHDL 3.1 06/12/2024        Imaging:   Ultrasound pelvis 2018:  IMPRESSION:   1.2 cm cyst in the right ovary.  This may represent interval reduction in size of the previously seen cyst or a new simple cyst.  In either case follow-up is unnecessary in an asymptomatic patient.      Family Hx:   Hx of PCOS: Denies  Diabetes: Father. Mother has prediabetes.  CAD: Father had stroke, paternal grandparents had MI.       Pre diabetes/Insulin resistance: Denies    A1c in 06/2024 was 5.5.    Lab Results   Component Value Date    LABA1C 5.1 08/02/2017    HGBA1C 5.5 06/12/2024    HGBA1C 5.6 12/13/2023    HGBA1C 5.1 06/21/2021        Patient has history of vitamin-D deficiency and is currently on vitamin-D supplements.  Recent vitamin-D was normal.     ROS:   As above    Objective:     /81 (BP Location: Right arm, Patient Position: Sitting, BP Method: Large (Automatic))   Pulse 65   Wt 83.6 kg (184 lb 4.9 oz)   BMI 33.71 kg/m²     Body mass index is 33.71 kg/m².    Physical Exam  Constitutional:       General: She is not in acute distress.     Appearance: She is not ill-appearing.   HENT:      Head: Normocephalic.      Comments: Dark coarse hair patch seen on her chin  Eyes:      Conjunctiva/sclera: Conjunctivae normal.   Cardiovascular:      Rate and Rhythm: Normal rate.   Pulmonary:      Effort: Pulmonary effort is normal.   Neurological:      Mental Status: She is alert and oriented to person, place, and time.         Lab Review:   Lab Results   Component Value Date    HGBA1C 5.5 06/12/2024     Lab Results   Component Value Date    CHOL 200 (H) 06/12/2024    HDL 64 06/12/2024    LDLCALC 118 (H) 06/12/2024     TRIG 79 06/12/2024    CHOLHDL 3.1 06/12/2024     Lab Results   Component Value Date     06/12/2024    K 4.7 06/12/2024     06/12/2024    CO2 27 06/12/2024    GLU 99 06/12/2024    BUN 15 06/12/2024    CREATININE 0.78 06/12/2024    CALCIUM 9.5 06/12/2024    PROT 7.0 06/12/2024    ALBUMIN 4.1 06/12/2024    BILITOT 0.3 06/12/2024    ALKPHOS 52 02/15/2019    AST 14 06/12/2024    ALT 13 06/12/2024    ANIONGAP 8 05/14/2018    EGFRNORACEVR 94 06/12/2024    TSH 2.36 06/12/2024       Assessment and Plan     Problem List Items Addressed This Visit          Derm    Hirsutism (Chronic)       Hirsutism 2/2 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  Testosterone levels are normal since her surgery.  Improvement in her hirsutism since the surgery.  She now shaves every few weeks.    Her prior hormone evaluation was normal.  Discussed doing a DST.  She does report concerns about hirsutism and is interested in starting spironolactone.  Discussed side effects of the medication and teratogenic effects.             Relevant Medications    dexAMETHasone (DECADRON) 1 MG Tab    Other Relevant Orders    Cortisol, 8AM    Dexamethasone       Endocrine    Virilization       S/p left oophorectomy for Leydig cell tumor.  Normal testosterone levels since the surgery.  Improvement in hirsutism and male pattern baldness.  Menstrual cycles are now regular.               Annette MARTIN Rai, MD

## 2024-07-24 NOTE — ASSESSMENT & PLAN NOTE
S/p left oophorectomy for Leydig cell tumor.  Normal testosterone levels since the surgery.  Improvement in hirsutism and male pattern baldness.  Menstrual cycles are now regular.

## 2024-07-26 ENCOUNTER — LAB VISIT (OUTPATIENT)
Dept: LAB | Facility: HOSPITAL | Age: 49
End: 2024-07-26
Attending: INTERNAL MEDICINE
Payer: COMMERCIAL

## 2024-07-26 DIAGNOSIS — L68.0 HIRSUTISM: ICD-10-CM

## 2024-07-26 PROCEDURE — 36415 COLL VENOUS BLD VENIPUNCTURE: CPT | Mod: PO | Performed by: INTERNAL MEDICINE

## 2024-07-26 PROCEDURE — 82542 COL CHROMOTOGRAPHY QUAL/QUAN: CPT | Performed by: INTERNAL MEDICINE

## 2024-07-26 PROCEDURE — 82533 TOTAL CORTISOL: CPT | Performed by: INTERNAL MEDICINE

## 2024-07-27 LAB — CORTIS SERPL-MCNC: <1 UG/DL (ref 4.3–22.4)

## 2024-07-30 ENCOUNTER — PATIENT MESSAGE (OUTPATIENT)
Dept: ENDOCRINOLOGY | Facility: CLINIC | Age: 49
End: 2024-07-30
Payer: COMMERCIAL

## 2024-07-30 DIAGNOSIS — L68.0 HIRSUTISM: Primary | Chronic | ICD-10-CM

## 2024-07-30 RX ORDER — SPIRONOLACTONE 50 MG/1
50 TABLET, FILM COATED ORAL 2 TIMES DAILY
Qty: 60 TABLET | Refills: 5 | Status: SHIPPED | OUTPATIENT
Start: 2024-07-30 | End: 2025-01-26

## (undated) DEVICE — STRIP STERI REIN CLSR 1/2X2IN

## (undated) DEVICE — COVER TIP CURVED SCISSORS XI

## (undated) DEVICE — SET TRI-LUMEN FILTERED TUBE

## (undated) DEVICE — SEE MEDLINE ITEM 157148

## (undated) DEVICE — ELECTRODE REM PLYHSV RETURN 9

## (undated) DEVICE — NDL INSUF ULTRA VERESS 120MM

## (undated) DEVICE — SCISSOR 5MMX35CM DIRECT DRIVE

## (undated) DEVICE — COVER LIGHT HANDLE 80/CA

## (undated) DEVICE — SUT CTD VICRYL 0 UND BR

## (undated) DEVICE — SEE MEDLINE ITEM 154981

## (undated) DEVICE — DRAPE SCOPE PILLOW WARMER

## (undated) DEVICE — Device

## (undated) DEVICE — TRAY CATH UM FOLEY SIL W 16FR

## (undated) DEVICE — DRAPE ABDOMINAL TIBURON 14X11

## (undated) DEVICE — TRAY MINOR GEN SURG

## (undated) DEVICE — LEGGINGS 48X31 INCH

## (undated) DEVICE — OBTURATOR BLADELESS 8MM XI

## (undated) DEVICE — IRRIGATOR ENDOSCOPY DISP.

## (undated) DEVICE — SUT MCRYL PLUS 4-0 PS2 27IN

## (undated) DEVICE — GOWN SURGICAL X-LARGE

## (undated) DEVICE — DRAPE COLUMN DAVINCI XI

## (undated) DEVICE — COVER LIGHT HANDLE

## (undated) DEVICE — LUBRICANT SURGILUBE 2 OZ

## (undated) DEVICE — SYR 50CC LL

## (undated) DEVICE — ADHESIVE DERMABOND ADVANCED

## (undated) DEVICE — SEAL UNIVERSAL 5MM-8MM XI

## (undated) DEVICE — SOL ELECTROLUBE ANTI-STIC

## (undated) DEVICE — SEE MEDLINE ITEM 157181

## (undated) DEVICE — DRAPE ARM DAVINCI XI

## (undated) DEVICE — KIT ANTIFOG